# Patient Record
Sex: MALE
[De-identification: names, ages, dates, MRNs, and addresses within clinical notes are randomized per-mention and may not be internally consistent; named-entity substitution may affect disease eponyms.]

---

## 2018-05-22 ENCOUNTER — HOSPITAL (OUTPATIENT)
Dept: OTHER | Age: 53
End: 2018-05-22
Attending: EMERGENCY MEDICINE

## 2018-05-22 LAB
ANALYZER ANC (IANC): ABNORMAL
ANION GAP SERPL CALC-SCNC: 10 MMOL/L (ref 10–20)
BASOPHILS # BLD: 0 THOUSAND/MCL (ref 0–0.3)
BASOPHILS NFR BLD: 1 %
BUN SERPL-MCNC: 16 MG/DL (ref 6–20)
BUN/CREAT SERPL: 15 (ref 7–25)
CALCIUM SERPL-MCNC: 9.1 MG/DL (ref 8.4–10.2)
CHLORIDE: 108 MMOL/L (ref 98–107)
CO2 SERPL-SCNC: 32 MMOL/L (ref 21–32)
CREAT SERPL-MCNC: 1.06 MG/DL (ref 0.67–1.17)
DIFFERENTIAL METHOD BLD: ABNORMAL
EOSINOPHIL # BLD: 0.1 THOUSAND/MCL (ref 0.1–0.5)
EOSINOPHIL NFR BLD: 2 %
ERYTHROCYTE [DISTWIDTH] IN BLOOD: 13 % (ref 11–15)
GLUCOSE SERPL-MCNC: 93 MG/DL (ref 65–99)
HEMATOCRIT: 40.6 % (ref 39–51)
HGB BLD-MCNC: 13.7 GM/DL (ref 13–17)
LYMPHOCYTES # BLD: 1.7 THOUSAND/MCL (ref 1–4)
LYMPHOCYTES NFR BLD: 52 %
MCH RBC QN AUTO: 30.6 PG (ref 26–34)
MCHC RBC AUTO-ENTMCNC: 33.7 GM/DL (ref 32–36.5)
MCV RBC AUTO: 90.8 FL (ref 78–100)
MONOCYTES # BLD: 0.3 THOUSAND/MCL (ref 0.3–0.9)
MONOCYTES NFR BLD: 11 %
NEUTROPHILS # BLD: 1.1 THOUSAND/MCL (ref 1.8–7.7)
NEUTROPHILS NFR BLD: 34 %
NEUTS SEG NFR BLD: ABNORMAL %
NRBC (NRBCRE): ABNORMAL
PLATELET # BLD: 192 THOUSAND/MCL (ref 140–450)
POTASSIUM SERPL-SCNC: 4.2 MMOL/L (ref 3.4–5.1)
RBC # BLD: 4.47 MILLION/MCL (ref 4.5–5.9)
SODIUM SERPL-SCNC: 146 MMOL/L (ref 135–145)
URATE SERPL-MCNC: 4.9 MG/DL (ref 3.5–7.2)
WBC # BLD: 3.2 THOUSAND/MCL (ref 4.2–11)

## 2021-08-27 PROBLEM — Z00.00 ENCOUNTER FOR PREVENTIVE HEALTH EXAMINATION: Status: ACTIVE | Noted: 2021-08-27

## 2021-09-03 DIAGNOSIS — Z01.818 ENCOUNTER FOR OTHER PREPROCEDURAL EXAMINATION: ICD-10-CM

## 2021-09-04 ENCOUNTER — APPOINTMENT (OUTPATIENT)
Dept: DISASTER EMERGENCY | Facility: CLINIC | Age: 56
End: 2021-09-04

## 2021-09-05 LAB — SARS-COV-2 N GENE NPH QL NAA+PROBE: NOT DETECTED

## 2021-09-07 ENCOUNTER — OUTPATIENT (OUTPATIENT)
Dept: OUTPATIENT SERVICES | Facility: HOSPITAL | Age: 56
LOS: 1 days | End: 2021-09-07
Payer: COMMERCIAL

## 2021-09-07 VITALS
RESPIRATION RATE: 16 BRPM | HEART RATE: 68 BPM | DIASTOLIC BLOOD PRESSURE: 90 MMHG | SYSTOLIC BLOOD PRESSURE: 121 MMHG | OXYGEN SATURATION: 96 %

## 2021-09-07 VITALS
TEMPERATURE: 99 F | OXYGEN SATURATION: 100 % | SYSTOLIC BLOOD PRESSURE: 149 MMHG | HEIGHT: 76 IN | WEIGHT: 255.07 LBS | RESPIRATION RATE: 18 BRPM | HEART RATE: 82 BPM | DIASTOLIC BLOOD PRESSURE: 87 MMHG

## 2021-09-07 DIAGNOSIS — R94.39 ABNORMAL RESULT OF OTHER CARDIOVASCULAR FUNCTION STUDY: ICD-10-CM

## 2021-09-07 LAB
ANION GAP SERPL CALC-SCNC: 12 MMOL/L — SIGNIFICANT CHANGE UP (ref 5–17)
BUN SERPL-MCNC: 12 MG/DL — SIGNIFICANT CHANGE UP (ref 7–23)
CALCIUM SERPL-MCNC: 10 MG/DL — SIGNIFICANT CHANGE UP (ref 8.4–10.5)
CHLORIDE SERPL-SCNC: 107 MMOL/L — SIGNIFICANT CHANGE UP (ref 96–108)
CO2 SERPL-SCNC: 22 MMOL/L — SIGNIFICANT CHANGE UP (ref 22–31)
CREAT SERPL-MCNC: 1.21 MG/DL — SIGNIFICANT CHANGE UP (ref 0.5–1.3)
GLUCOSE SERPL-MCNC: 111 MG/DL — HIGH (ref 70–99)
HCT VFR BLD CALC: 40.5 % — SIGNIFICANT CHANGE UP (ref 39–50)
HGB BLD-MCNC: 12.7 G/DL — LOW (ref 13–17)
MAGNESIUM SERPL-MCNC: 2 MG/DL — SIGNIFICANT CHANGE UP (ref 1.6–2.6)
MCHC RBC-ENTMCNC: 28.6 PG — SIGNIFICANT CHANGE UP (ref 27–34)
MCHC RBC-ENTMCNC: 31.4 GM/DL — LOW (ref 32–36)
MCV RBC AUTO: 91.2 FL — SIGNIFICANT CHANGE UP (ref 80–100)
NRBC # BLD: 0 /100 WBCS — SIGNIFICANT CHANGE UP (ref 0–0)
PLATELET # BLD AUTO: 227 K/UL — SIGNIFICANT CHANGE UP (ref 150–400)
POTASSIUM SERPL-MCNC: 4.3 MMOL/L — SIGNIFICANT CHANGE UP (ref 3.5–5.3)
POTASSIUM SERPL-SCNC: 4.3 MMOL/L — SIGNIFICANT CHANGE UP (ref 3.5–5.3)
RBC # BLD: 4.44 M/UL — SIGNIFICANT CHANGE UP (ref 4.2–5.8)
RBC # FLD: 13.2 % — SIGNIFICANT CHANGE UP (ref 10.3–14.5)
SODIUM SERPL-SCNC: 141 MMOL/L — SIGNIFICANT CHANGE UP (ref 135–145)
WBC # BLD: 6.03 K/UL — SIGNIFICANT CHANGE UP (ref 3.8–10.5)
WBC # FLD AUTO: 6.03 K/UL — SIGNIFICANT CHANGE UP (ref 3.8–10.5)

## 2021-09-07 PROCEDURE — 83735 ASSAY OF MAGNESIUM: CPT

## 2021-09-07 PROCEDURE — C1887: CPT

## 2021-09-07 PROCEDURE — 93010 ELECTROCARDIOGRAM REPORT: CPT

## 2021-09-07 PROCEDURE — 93458 L HRT ARTERY/VENTRICLE ANGIO: CPT | Mod: 26

## 2021-09-07 PROCEDURE — 80048 BASIC METABOLIC PNL TOTAL CA: CPT

## 2021-09-07 PROCEDURE — 99152 MOD SED SAME PHYS/QHP 5/>YRS: CPT

## 2021-09-07 PROCEDURE — C1769: CPT

## 2021-09-07 PROCEDURE — 85027 COMPLETE CBC AUTOMATED: CPT

## 2021-09-07 PROCEDURE — 93458 L HRT ARTERY/VENTRICLE ANGIO: CPT

## 2021-09-07 PROCEDURE — C1894: CPT

## 2021-09-07 PROCEDURE — 93005 ELECTROCARDIOGRAM TRACING: CPT

## 2021-09-07 RX ORDER — AMLODIPINE BESYLATE 2.5 MG/1
1 TABLET ORAL
Qty: 0 | Refills: 0 | DISCHARGE

## 2021-09-07 RX ORDER — METOPROLOL TARTRATE 50 MG
1 TABLET ORAL
Qty: 0 | Refills: 0 | DISCHARGE

## 2021-09-07 RX ORDER — TAMSULOSIN HYDROCHLORIDE 0.4 MG/1
1 CAPSULE ORAL
Qty: 0 | Refills: 0 | DISCHARGE

## 2021-09-07 RX ORDER — ATORVASTATIN CALCIUM 80 MG/1
1 TABLET, FILM COATED ORAL
Qty: 0 | Refills: 0 | DISCHARGE

## 2021-09-07 RX ORDER — ASPIRIN/CALCIUM CARB/MAGNESIUM 324 MG
1 TABLET ORAL
Qty: 0 | Refills: 0 | DISCHARGE

## 2021-09-07 RX ORDER — LOSARTAN POTASSIUM 100 MG/1
1 TABLET, FILM COATED ORAL
Qty: 0 | Refills: 0 | DISCHARGE

## 2021-09-07 NOTE — ASU DISCHARGE PLAN (ADULT/PEDIATRIC) - ASU DC SPECIAL INSTRUCTIONSFT

## 2021-09-07 NOTE — H&P CARDIOLOGY - HISTORY OF PRESENT ILLNESS
56 years old male with family Hx of CAD (both parents) PMHx of essential tremor (mild, for about 6 months), HTN who underwent routine stress test that showed ischemia in RCA and was referred for [facility] for further ischemic evaluation.  Denies  CP, dizziness, diaphoresis, palpitations, nausea, vomiting, peripheral edema, recent weight gain, or syncopal episodes.  Denies any implantable cardiac monitoring device implants.         PCP Dr Dilia Flores

## 2021-09-07 NOTE — ASU DISCHARGE PLAN (ADULT/PEDIATRIC) - CARE PROVIDER_API CALL
Nick Blanco  CARDIOLOGY  [address], [place] [address]  Phone: [phone]  Fax: [phone]  Follow Up Time: 2 weeks

## 2024-01-01 ENCOUNTER — EMERGENCY (EMERGENCY)
Facility: HOSPITAL | Age: 59
LOS: 0 days | Discharge: HOME | End: 2024-08-31
Attending: STUDENT IN AN ORGANIZED HEALTH CARE EDUCATION/TRAINING PROGRAM
Payer: MEDICARE

## 2024-01-01 ENCOUNTER — INPATIENT (INPATIENT)
Facility: HOSPITAL | Age: 59
LOS: 1 days | End: 2024-09-02
Attending: INTERNAL MEDICINE | Admitting: INTERNAL MEDICINE
Payer: MEDICAID

## 2024-01-01 VITALS
HEART RATE: 60 BPM | HEIGHT: 76 IN | RESPIRATION RATE: 20 BRPM | DIASTOLIC BLOOD PRESSURE: 93 MMHG | SYSTOLIC BLOOD PRESSURE: 139 MMHG | OXYGEN SATURATION: 96 % | TEMPERATURE: 98 F | WEIGHT: 250 LBS

## 2024-01-01 VITALS
TEMPERATURE: 95 F | DIASTOLIC BLOOD PRESSURE: 93 MMHG | HEART RATE: 60 BPM | RESPIRATION RATE: 16 BRPM | SYSTOLIC BLOOD PRESSURE: 147 MMHG | WEIGHT: 250 LBS | HEIGHT: 76 IN | OXYGEN SATURATION: 99 %

## 2024-01-01 VITALS
HEART RATE: 60 BPM | OXYGEN SATURATION: 98 % | TEMPERATURE: 96 F | SYSTOLIC BLOOD PRESSURE: 147 MMHG | RESPIRATION RATE: 18 BRPM | DIASTOLIC BLOOD PRESSURE: 94 MMHG

## 2024-01-01 VITALS — TEMPERATURE: 97 F

## 2024-01-01 DIAGNOSIS — S00.83XA CONTUSION OF OTHER PART OF HEAD, INITIAL ENCOUNTER: ICD-10-CM

## 2024-01-01 DIAGNOSIS — I10 ESSENTIAL (PRIMARY) HYPERTENSION: ICD-10-CM

## 2024-01-01 DIAGNOSIS — S09.90XA UNSPECIFIED INJURY OF HEAD, INITIAL ENCOUNTER: ICD-10-CM
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
## 2024-01-01 DIAGNOSIS — N40.0 BENIGN PROSTATIC HYPERPLASIA WITHOUT LOWER URINARY TRACT SYMPTOMS: ICD-10-CM

## 2024-01-01 DIAGNOSIS — J98.4 OTHER DISORDERS OF LUNG: ICD-10-CM

## 2024-01-01 DIAGNOSIS — F03.90 UNSPECIFIED DEMENTIA, UNSPECIFIED SEVERITY, WITHOUT BEHAVIORAL DISTURBANCE, PSYCHOTIC DISTURBANCE, MOOD DISTURBANCE, AND ANXIETY: ICD-10-CM

## 2024-01-01 DIAGNOSIS — Z86.69 PERSONAL HISTORY OF OTHER DISEASES OF THE NERVOUS SYSTEM AND SENSE ORGANS: ICD-10-CM

## 2024-01-01 DIAGNOSIS — R53.1 WEAKNESS: ICD-10-CM

## 2024-01-01 DIAGNOSIS — G20.A1 PARKINSON'S DISEASE WITHOUT DYSKINESIA, WITHOUT MENTION OF FLUCTUATIONS: ICD-10-CM

## 2024-01-01 DIAGNOSIS — M25.512 PAIN IN LEFT SHOULDER: ICD-10-CM
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
## 2024-01-01 LAB
ALBUMIN SERPL ELPH-MCNC: 3.2 G/DL — LOW (ref 3.3–5)
ALP SERPL-CCNC: 86 U/L — SIGNIFICANT CHANGE UP (ref 40–120)
ALT FLD-CCNC: 52 U/L — SIGNIFICANT CHANGE UP (ref 12–78)
ANION GAP SERPL CALC-SCNC: 5 MMOL/L — SIGNIFICANT CHANGE UP (ref 5–17)
AST SERPL-CCNC: 85 U/L — HIGH (ref 15–37)
BASOPHILS # BLD AUTO: 0.02 K/UL — SIGNIFICANT CHANGE UP (ref 0–0.2)
BASOPHILS NFR BLD AUTO: 0.4 % — SIGNIFICANT CHANGE UP (ref 0–2)
BILIRUB SERPL-MCNC: 0.5 MG/DL — SIGNIFICANT CHANGE UP (ref 0.2–1.2)
BUN SERPL-MCNC: 18 MG/DL — SIGNIFICANT CHANGE UP (ref 7–23)
CALCIUM SERPL-MCNC: 9.4 MG/DL — SIGNIFICANT CHANGE UP (ref 8.5–10.1)
CHLORIDE SERPL-SCNC: 109 MMOL/L — HIGH (ref 96–108)
CO2 SERPL-SCNC: 25 MMOL/L — SIGNIFICANT CHANGE UP (ref 22–31)
CREAT SERPL-MCNC: 0.94 MG/DL — SIGNIFICANT CHANGE UP (ref 0.5–1.3)
EGFR: 92 ML/MIN/1.73M2 — SIGNIFICANT CHANGE UP
EGFR: 92 ML/MIN/1.73M2 — SIGNIFICANT CHANGE UP
EOSINOPHIL # BLD AUTO: 0.04 K/UL — SIGNIFICANT CHANGE UP (ref 0–0.5)
EOSINOPHIL NFR BLD AUTO: 0.8 % — SIGNIFICANT CHANGE UP (ref 0–6)
GLUCOSE BLDC GLUCOMTR-MCNC: 17 MG/DL — CRITICAL LOW (ref 70–99)
GLUCOSE BLDC GLUCOMTR-MCNC: 24 MG/DL — CRITICAL LOW (ref 70–99)
GLUCOSE BLDC GLUCOMTR-MCNC: >600 MG/DL — CRITICAL HIGH (ref 70–99)
GLUCOSE SERPL-MCNC: 93 MG/DL — SIGNIFICANT CHANGE UP (ref 70–99)
HCT VFR BLD CALC: 35.5 % — LOW (ref 39–50)
HGB BLD-MCNC: 11.5 G/DL — LOW (ref 13–17)
IMM GRANULOCYTES NFR BLD AUTO: 0.2 % — SIGNIFICANT CHANGE UP (ref 0–0.9)
LYMPHOCYTES # BLD AUTO: 1.88 K/UL — SIGNIFICANT CHANGE UP (ref 1–3.3)
LYMPHOCYTES # BLD AUTO: 36.4 % — SIGNIFICANT CHANGE UP (ref 13–44)
MCHC RBC-ENTMCNC: 29.2 PG — SIGNIFICANT CHANGE UP (ref 27–34)
MCHC RBC-ENTMCNC: 32.4 G/DL — SIGNIFICANT CHANGE UP (ref 32–36)
MCV RBC AUTO: 90.1 FL — SIGNIFICANT CHANGE UP (ref 80–100)
MONOCYTES # BLD AUTO: 0.89 K/UL — SIGNIFICANT CHANGE UP (ref 0–0.9)
MONOCYTES NFR BLD AUTO: 17.2 % — HIGH (ref 2–14)
NEUTROPHILS # BLD AUTO: 2.33 K/UL — SIGNIFICANT CHANGE UP (ref 1.8–7.4)
NEUTROPHILS NFR BLD AUTO: 45 % — SIGNIFICANT CHANGE UP (ref 43–77)
NRBC # BLD: 0 /100 WBCS — SIGNIFICANT CHANGE UP (ref 0–0)
NRBC BLD-RTO: 0 /100 WBCS — SIGNIFICANT CHANGE UP (ref 0–0)
PLATELET # BLD AUTO: 171 K/UL — SIGNIFICANT CHANGE UP (ref 150–400)
POTASSIUM SERPL-MCNC: 4.9 MMOL/L — SIGNIFICANT CHANGE UP (ref 3.5–5.3)
POTASSIUM SERPL-SCNC: 4.9 MMOL/L — SIGNIFICANT CHANGE UP (ref 3.5–5.3)
PROT SERPL-MCNC: 7 GM/DL — SIGNIFICANT CHANGE UP (ref 6–8.3)
RBC # BLD: 3.94 M/UL — LOW (ref 4.2–5.8)
RBC # FLD: 14.8 % — HIGH (ref 10.3–14.5)
SODIUM SERPL-SCNC: 139 MMOL/L — SIGNIFICANT CHANGE UP (ref 135–145)
WBC # BLD: 5.17 K/UL — SIGNIFICANT CHANGE UP (ref 3.8–10.5)
WBC # FLD AUTO: 5.17 K/UL — SIGNIFICANT CHANGE UP (ref 3.8–10.5)

## 2024-01-01 PROCEDURE — 73030 X-RAY EXAM OF SHOULDER: CPT | Mod: 26,RT

## 2024-01-01 PROCEDURE — 72125 CT NECK SPINE W/O DYE: CPT | Mod: 26,MC

## 2024-01-01 PROCEDURE — 99285 EMERGENCY DEPT VISIT HI MDM: CPT

## 2024-01-01 PROCEDURE — 99232 SBSQ HOSP IP/OBS MODERATE 35: CPT

## 2024-01-01 PROCEDURE — 70450 CT HEAD/BRAIN W/O DYE: CPT | Mod: 26,MC

## 2024-01-01 PROCEDURE — 99223 1ST HOSP IP/OBS HIGH 75: CPT

## 2024-01-01 PROCEDURE — 72192 CT PELVIS W/O DYE: CPT | Mod: 26,MC

## 2024-01-01 PROCEDURE — 71250 CT THORAX DX C-: CPT | Mod: 26,MC

## 2024-01-01 PROCEDURE — 99239 HOSP IP/OBS DSCHRG MGMT >30: CPT

## 2024-01-01 RX ORDER — TAMSULOSIN HYDROCHLORIDE 0.4 MG/1
0.4 CAPSULE ORAL AT BEDTIME
Refills: 0 | Status: DISCONTINUED | OUTPATIENT
Start: 2024-01-01 | End: 2024-01-01

## 2024-01-01 RX ORDER — METOPROLOL TARTRATE 100 MG/1
25 TABLET ORAL DAILY
Refills: 0 | Status: DISCONTINUED | OUTPATIENT
Start: 2024-01-01 | End: 2024-01-01

## 2024-01-01 RX ORDER — ERGOCALCIFEROL (VITAMIN D2) 200 MCG/ML
50000 DROPS ORAL
Refills: 0 | Status: DISCONTINUED | OUTPATIENT
Start: 2024-01-01 | End: 2024-01-01

## 2024-01-01 RX ORDER — LOSARTAN POTASSIUM 50 MG/1
100 TABLET ORAL DAILY
Refills: 0 | Status: DISCONTINUED | OUTPATIENT
Start: 2024-01-01 | End: 2024-01-01

## 2024-01-01 RX ORDER — AMLODIPINE BESYLATE 10 MG/1
5 TABLET ORAL DAILY
Refills: 0 | Status: DISCONTINUED | OUTPATIENT
Start: 2024-01-01 | End: 2024-01-01

## 2024-01-01 RX ORDER — PANTOPRAZOLE SODIUM 40 MG
40 TABLET, DELAYED RELEASE (ENTERIC COATED) ORAL
Refills: 0 | Status: DISCONTINUED | OUTPATIENT
Start: 2024-01-01 | End: 2024-01-01

## 2024-01-01 RX ORDER — ENOXAPARIN SODIUM 100 MG/ML
40 INJECTION SUBCUTANEOUS EVERY 24 HOURS
Refills: 0 | Status: DISCONTINUED | OUTPATIENT
Start: 2024-01-01 | End: 2024-01-01

## 2024-01-01 RX ORDER — CYANOCOBALAMIN (VITAMIN B-12) 500MCG/0.1
1000 GEL (ML) NASAL DAILY
Refills: 0 | Status: DISCONTINUED | OUTPATIENT
Start: 2024-01-01 | End: 2024-01-01

## 2024-01-01 RX ORDER — LACTULOSE 10 G
20 PACKET (EA) ORAL
Refills: 0 | Status: DISCONTINUED | OUTPATIENT
Start: 2024-01-01 | End: 2024-01-01

## 2024-01-01 RX ORDER — SENNA 187 MG
2 TABLET ORAL AT BEDTIME
Refills: 0 | Status: DISCONTINUED | OUTPATIENT
Start: 2024-01-01 | End: 2024-01-01

## 2024-01-01 RX ORDER — AMANTADINE HCL 100 MG
100 CAPSULE ORAL DAILY
Refills: 0 | Status: DISCONTINUED | OUTPATIENT
Start: 2024-01-01 | End: 2024-01-01

## 2024-01-01 RX ORDER — FOLIC ACID/MULTIVIT,IRON,MINER 0.4MG-18MG
1000 TABLET,CHEWABLE ORAL
Refills: 0 | Status: DISCONTINUED | OUTPATIENT
Start: 2024-01-01 | End: 2024-01-01

## 2024-01-01 RX ORDER — ASPIRIN 81 MG
81 TABLET, DELAYED RELEASE (ENTERIC COATED) ORAL DAILY
Refills: 0 | Status: DISCONTINUED | OUTPATIENT
Start: 2024-01-01 | End: 2024-01-01

## 2024-01-01 RX ADMIN — Medication 100 MILLIGRAM(S): at 12:00

## 2024-01-01 RX ADMIN — Medication 1000 MICROGRAM(S): at 12:00

## 2024-01-01 RX ADMIN — Medication 81 MILLIGRAM(S): at 11:59

## 2024-01-01 RX ADMIN — LOSARTAN POTASSIUM 100 MILLIGRAM(S): 50 TABLET ORAL at 05:37

## 2024-01-01 RX ADMIN — Medication 2 TABLET(S): at 21:44

## 2024-01-01 RX ADMIN — AMLODIPINE BESYLATE 5 MILLIGRAM(S): 10 TABLET ORAL at 05:37

## 2024-01-01 RX ADMIN — METOPROLOL TARTRATE 25 MILLIGRAM(S): 100 TABLET ORAL at 05:37

## 2024-01-01 RX ADMIN — Medication 20 GRAM(S): at 14:37

## 2024-01-01 RX ADMIN — ENOXAPARIN SODIUM 40 MILLIGRAM(S): 100 INJECTION SUBCUTANEOUS at 05:37

## 2024-01-01 RX ADMIN — Medication 20 MILLIGRAM(S): at 21:45

## 2024-01-01 RX ADMIN — TAMSULOSIN HYDROCHLORIDE 0.4 MILLIGRAM(S): 0.4 CAPSULE ORAL at 21:45

## 2024-03-29 ENCOUNTER — INPATIENT (INPATIENT)
Facility: HOSPITAL | Age: 59
LOS: 3 days | Discharge: NURSING, REHAB OR LONG-TERM CARE | End: 2024-04-02
Attending: INTERNAL MEDICINE | Admitting: INTERNAL MEDICINE
Payer: MEDICAID

## 2024-03-29 VITALS
HEIGHT: 75.5 IN | RESPIRATION RATE: 16 BRPM | OXYGEN SATURATION: 99 % | TEMPERATURE: 98 F | HEART RATE: 72 BPM | WEIGHT: 255.07 LBS | SYSTOLIC BLOOD PRESSURE: 123 MMHG | DIASTOLIC BLOOD PRESSURE: 69 MMHG

## 2024-03-29 PROCEDURE — 99285 EMERGENCY DEPT VISIT HI MDM: CPT

## 2024-03-29 NOTE — ED ADULT TRIAGE NOTE - CHIEF COMPLAINT QUOTE
BIBA patient fell twice today, + head strike yesterday, - loc. c/o dizziness. AAOX3 in triage. Hx parkinson. Takes aspirin daily. F/S 94

## 2024-03-30 ENCOUNTER — RESULT REVIEW (OUTPATIENT)
Age: 59
End: 2024-03-30

## 2024-03-30 DIAGNOSIS — G20.A1 PARKINSON'S DISEASE WITHOUT DYSKINESIA, WITHOUT MENTION OF FLUCTUATIONS: ICD-10-CM

## 2024-03-30 DIAGNOSIS — E78.5 HYPERLIPIDEMIA, UNSPECIFIED: ICD-10-CM

## 2024-03-30 DIAGNOSIS — I10 ESSENTIAL (PRIMARY) HYPERTENSION: ICD-10-CM

## 2024-03-30 DIAGNOSIS — R55 SYNCOPE AND COLLAPSE: ICD-10-CM

## 2024-03-30 DIAGNOSIS — N40.0 BENIGN PROSTATIC HYPERPLASIA WITHOUT LOWER URINARY TRACT SYMPTOMS: ICD-10-CM

## 2024-03-30 DIAGNOSIS — I25.10 ATHEROSCLEROTIC HEART DISEASE OF NATIVE CORONARY ARTERY WITHOUT ANGINA PECTORIS: ICD-10-CM

## 2024-03-30 DIAGNOSIS — R29.6 REPEATED FALLS: ICD-10-CM

## 2024-03-30 LAB
ALBUMIN SERPL ELPH-MCNC: 3.2 G/DL — LOW (ref 3.3–5)
ALP SERPL-CCNC: 70 U/L — SIGNIFICANT CHANGE UP (ref 40–120)
ALT FLD-CCNC: 14 U/L — SIGNIFICANT CHANGE UP (ref 12–78)
ANION GAP SERPL CALC-SCNC: 5 MMOL/L — SIGNIFICANT CHANGE UP (ref 5–17)
APTT BLD: 32.1 SEC — SIGNIFICANT CHANGE UP (ref 24.5–35.6)
AST SERPL-CCNC: 30 U/L — SIGNIFICANT CHANGE UP (ref 15–37)
BASOPHILS # BLD AUTO: 0.02 K/UL — SIGNIFICANT CHANGE UP (ref 0–0.2)
BASOPHILS NFR BLD AUTO: 0.3 % — SIGNIFICANT CHANGE UP (ref 0–2)
BILIRUB SERPL-MCNC: 0.6 MG/DL — SIGNIFICANT CHANGE UP (ref 0.2–1.2)
BUN SERPL-MCNC: 19 MG/DL — SIGNIFICANT CHANGE UP (ref 7–23)
CALCIUM SERPL-MCNC: 9.4 MG/DL — SIGNIFICANT CHANGE UP (ref 8.5–10.1)
CHLORIDE SERPL-SCNC: 108 MMOL/L — SIGNIFICANT CHANGE UP (ref 96–108)
CO2 SERPL-SCNC: 26 MMOL/L — SIGNIFICANT CHANGE UP (ref 22–31)
CREAT SERPL-MCNC: 1.03 MG/DL — SIGNIFICANT CHANGE UP (ref 0.5–1.3)
EGFR: 83 ML/MIN/1.73M2 — SIGNIFICANT CHANGE UP
EOSINOPHIL # BLD AUTO: 0.07 K/UL — SIGNIFICANT CHANGE UP (ref 0–0.5)
EOSINOPHIL NFR BLD AUTO: 1.1 % — SIGNIFICANT CHANGE UP (ref 0–6)
GLUCOSE SERPL-MCNC: 93 MG/DL — SIGNIFICANT CHANGE UP (ref 70–99)
HCT VFR BLD CALC: 37 % — LOW (ref 39–50)
HGB BLD-MCNC: 12 G/DL — LOW (ref 13–17)
IMM GRANULOCYTES NFR BLD AUTO: 0.2 % — SIGNIFICANT CHANGE UP (ref 0–0.9)
INR BLD: 1.14 RATIO — SIGNIFICANT CHANGE UP (ref 0.85–1.18)
LYMPHOCYTES # BLD AUTO: 2.08 K/UL — SIGNIFICANT CHANGE UP (ref 1–3.3)
LYMPHOCYTES # BLD AUTO: 32.2 % — SIGNIFICANT CHANGE UP (ref 13–44)
MCHC RBC-ENTMCNC: 29.5 PG — SIGNIFICANT CHANGE UP (ref 27–34)
MCHC RBC-ENTMCNC: 32.4 G/DL — SIGNIFICANT CHANGE UP (ref 32–36)
MCV RBC AUTO: 90.9 FL — SIGNIFICANT CHANGE UP (ref 80–100)
MONOCYTES # BLD AUTO: 0.86 K/UL — SIGNIFICANT CHANGE UP (ref 0–0.9)
MONOCYTES NFR BLD AUTO: 13.3 % — SIGNIFICANT CHANGE UP (ref 2–14)
NEUTROPHILS # BLD AUTO: 3.42 K/UL — SIGNIFICANT CHANGE UP (ref 1.8–7.4)
NEUTROPHILS NFR BLD AUTO: 52.9 % — SIGNIFICANT CHANGE UP (ref 43–77)
NRBC # BLD: 0 /100 WBCS — SIGNIFICANT CHANGE UP (ref 0–0)
PLATELET # BLD AUTO: 189 K/UL — SIGNIFICANT CHANGE UP (ref 150–400)
POTASSIUM SERPL-MCNC: 4.6 MMOL/L — SIGNIFICANT CHANGE UP (ref 3.5–5.3)
POTASSIUM SERPL-SCNC: 4.6 MMOL/L — SIGNIFICANT CHANGE UP (ref 3.5–5.3)
PROT SERPL-MCNC: 7.2 GM/DL — SIGNIFICANT CHANGE UP (ref 6–8.3)
PROTHROM AB SERPL-ACNC: 13.6 SEC — HIGH (ref 9.5–13)
RBC # BLD: 4.07 M/UL — LOW (ref 4.2–5.8)
RBC # FLD: 13.5 % — SIGNIFICANT CHANGE UP (ref 10.3–14.5)
SODIUM SERPL-SCNC: 139 MMOL/L — SIGNIFICANT CHANGE UP (ref 135–145)
TROPONIN I, HIGH SENSITIVITY RESULT: 15.3 NG/L — SIGNIFICANT CHANGE UP
WBC # BLD: 6.46 K/UL — SIGNIFICANT CHANGE UP (ref 3.8–10.5)
WBC # FLD AUTO: 6.46 K/UL — SIGNIFICANT CHANGE UP (ref 3.8–10.5)

## 2024-03-30 PROCEDURE — 70450 CT HEAD/BRAIN W/O DYE: CPT | Mod: 26,MC

## 2024-03-30 PROCEDURE — 71045 X-RAY EXAM CHEST 1 VIEW: CPT | Mod: 26

## 2024-03-30 PROCEDURE — 72170 X-RAY EXAM OF PELVIS: CPT | Mod: 26

## 2024-03-30 PROCEDURE — 93010 ELECTROCARDIOGRAM REPORT: CPT

## 2024-03-30 PROCEDURE — 93306 TTE W/DOPPLER COMPLETE: CPT | Mod: 26

## 2024-03-30 PROCEDURE — 99221 1ST HOSP IP/OBS SF/LOW 40: CPT

## 2024-03-30 RX ORDER — TAMSULOSIN HYDROCHLORIDE 0.4 MG/1
0.4 CAPSULE ORAL AT BEDTIME
Refills: 0 | Status: DISCONTINUED | OUTPATIENT
Start: 2024-03-30 | End: 2024-04-02

## 2024-03-30 RX ORDER — LANOLIN ALCOHOL/MO/W.PET/CERES
3 CREAM (GRAM) TOPICAL AT BEDTIME
Refills: 0 | Status: DISCONTINUED | OUTPATIENT
Start: 2024-03-30 | End: 2024-04-02

## 2024-03-30 RX ORDER — ASPIRIN/CALCIUM CARB/MAGNESIUM 324 MG
81 TABLET ORAL DAILY
Refills: 0 | Status: DISCONTINUED | OUTPATIENT
Start: 2024-03-30 | End: 2024-04-02

## 2024-03-30 RX ORDER — ACETAMINOPHEN 500 MG
650 TABLET ORAL EVERY 6 HOURS
Refills: 0 | Status: DISCONTINUED | OUTPATIENT
Start: 2024-03-30 | End: 2024-04-02

## 2024-03-30 RX ORDER — PREGABALIN 225 MG/1
2000 CAPSULE ORAL DAILY
Refills: 0 | Status: DISCONTINUED | OUTPATIENT
Start: 2024-03-30 | End: 2024-04-02

## 2024-03-30 RX ORDER — LOSARTAN POTASSIUM 100 MG/1
100 TABLET, FILM COATED ORAL DAILY
Refills: 0 | Status: DISCONTINUED | OUTPATIENT
Start: 2024-03-30 | End: 2024-04-02

## 2024-03-30 RX ORDER — AMLODIPINE BESYLATE 2.5 MG/1
5 TABLET ORAL DAILY
Refills: 0 | Status: DISCONTINUED | OUTPATIENT
Start: 2024-03-30 | End: 2024-04-02

## 2024-03-30 RX ORDER — METOPROLOL TARTRATE 50 MG
25 TABLET ORAL DAILY
Refills: 0 | Status: DISCONTINUED | OUTPATIENT
Start: 2024-03-30 | End: 2024-04-02

## 2024-03-30 RX ORDER — ATORVASTATIN CALCIUM 80 MG/1
20 TABLET, FILM COATED ORAL AT BEDTIME
Refills: 0 | Status: DISCONTINUED | OUTPATIENT
Start: 2024-03-30 | End: 2024-04-02

## 2024-03-30 RX ORDER — ONDANSETRON 8 MG/1
4 TABLET, FILM COATED ORAL EVERY 8 HOURS
Refills: 0 | Status: DISCONTINUED | OUTPATIENT
Start: 2024-03-30 | End: 2024-04-02

## 2024-03-30 RX ADMIN — AMLODIPINE BESYLATE 5 MILLIGRAM(S): 2.5 TABLET ORAL at 07:47

## 2024-03-30 RX ADMIN — PREGABALIN 2000 MICROGRAM(S): 225 CAPSULE ORAL at 11:35

## 2024-03-30 RX ADMIN — TAMSULOSIN HYDROCHLORIDE 0.4 MILLIGRAM(S): 0.4 CAPSULE ORAL at 21:33

## 2024-03-30 RX ADMIN — Medication 25 MILLIGRAM(S): at 07:12

## 2024-03-30 RX ADMIN — LOSARTAN POTASSIUM 100 MILLIGRAM(S): 100 TABLET, FILM COATED ORAL at 07:47

## 2024-03-30 RX ADMIN — ATORVASTATIN CALCIUM 20 MILLIGRAM(S): 80 TABLET, FILM COATED ORAL at 21:34

## 2024-03-30 RX ADMIN — Medication 81 MILLIGRAM(S): at 11:35

## 2024-03-30 NOTE — ED ADULT NURSE NOTE - ED STAT RN HANDOFF DETAILS
Handoff report received from CUAUHTEMOC GALAN Pt is stable at this time. Denies any pain or complaints.

## 2024-03-30 NOTE — PHYSICAL THERAPY INITIAL EVALUATION ADULT - GENERAL OBSERVATIONS, REHAB EVAL
Pt was encountered supine in bed in ERHO; NAD, cardiac/O2 monitors in place, AXOX3; pt had no complaints of pain. PT Nghia & RN Brandie present at bedside. Pt noted with intermittent resting tremors, impaired dynamic standing balance, decreased coordination & generalized weakness, Skyler Mason at bedside.

## 2024-03-30 NOTE — OCCUPATIONAL THERAPY INITIAL EVALUATION ADULT - PERTINENT HX OF CURRENT PROBLEM, REHAB EVAL
Pt brought to ED secondary to fall. X-ray pelvis negative for acute fracture. Pt has PMH of Parkinsons Disease & frequent falls.

## 2024-03-30 NOTE — PHYSICAL THERAPY INITIAL EVALUATION ADULT - DID THE PATIENT HAVE SURGERY?
This is the hx of NIHARIKA a 60 y/o male patient who was admitted to [facility] due to complications of Near Syncope affecting medical condition and with subsequent affection on functional mobility./n/a

## 2024-03-30 NOTE — ED PROVIDER NOTE - CLINICAL SUMMARY MEDICAL DECISION MAKING FREE TEXT BOX
59-year-old male hypertension, Angioplasty in 2021, Parkinson's presents today for fall.  Patient endorsing feeling lightheaded.  Has had multiple falls in the past couple days.  Patient bedside to provide collateral and states that patient has been declining over the past couple months and will intermittently fall due to his Parkinson's.  Does not use anything to ambulate with.  States that his sister and herself takes care of the patient.  States that he had head today.  Denies LOC.  Does take aspirin daily.  Denies any chest pain or shortness of breath.  Denies dizziness or room spinning sensation.  Denies nausea or vomiting.  Denies dysuria.  Patient has no focal deficits on exam.  Patient with a resting tremor.  No signs of external trauma.  Consider near syncope.  Consider falls from worsening Parkinson's.  Will get CT head.  Check labs.  Can benefit from mission for near syncope/cardiology workup, physical therapy evaluation as patient is not using anything to ambulate and has had multiple falls over the past couple months.

## 2024-03-30 NOTE — H&P ADULT - NSHPLABSRESULTS_GEN_ALL_CORE
12.0   6.46  )-----------( 189      ( 30 Mar 2024 02:30 )             37.0    03-30    139  |  108  |  19  ----------------------------<  93  4.6   |  26  |  1.03    Ca    9.4      30 Mar 2024 02:30    TPro  7.2  /  Alb  3.2<L>  /  TBili  0.6  /  DBili  x   /  AST  30  /  ALT  14  /  AlkPhos  70  03-30    CT head without contrast 3/30/24  IMPRESSION:  No evidence of acute intracranial hemorrhage, midline shift or CT evidence of acute territorial infarct.    If the patient's symptoms persist, consider short interval follow-up head CT or brain MRI if there are no MRI contraindications

## 2024-03-30 NOTE — ED PROVIDER NOTE - NS ED ROS FT
"My left ankle has pain and swelling and I've been treated for gout for the past month but now my doctor thinks it may be cellulitis."
General: Denies fever, chills  HEENT: Denies sensory changes, sore throat  Neck: Denies neck pain, neck stiffness  Resp: Denies coughing, SOB  Cardiovascular: Denies CP, palpitations, LE edema  GI: Denies nausea, vomiting, abdominal pain, diarrhea, constipation, blood in stool  : Denies dysuria, hematuria, frequency, incontinence  MSK: Denies back pain  Neuro: Denies HA, dizziness, numbness, weakness  Skin: Denies rashes.

## 2024-03-30 NOTE — H&P ADULT - ASSESSMENT
Trung Ferguson is a 59 year old male with PMHx of HTN, HLD, BPH, CAD (s/p PCI in 2021), and Parkinson's disease who presented to the ED on 3/29/24 for complaints of recurrent falls and admitted for recurrent falls.    Recurrent falls, possibly secondary to dysautonomia due to progressive Parkinson's? vs. syncope  Complaints of fall x 3 at home within the past week, one fall with associated head hit, all episodes unwitnessed  Baseline ambulates unassisted and holds onto stair rails to ambulate up and down the stairs as per fam  EKG NSR, NCHCT without acute intracranial findings  Unable to obtain orthostatics standing given fall risk  Fall precautions  F/u echocardiogram to r/o structural or valvular heart disease as cause of ?syncope  PT/OT consulted  Consult neurology for consideration of initiation of Parkinson's medications - please call in AM      Chronic medical conditions:   HTN: PTA losartan 100 mg, amlodipine 5 mg, metoprolol succinate 25 mg, hold parameters added  HLD: PTA atorvastatin 20 mg  BPH: PTA tamsulosin 0.4 mg  CAD (s/p PCI in 2021): PTA ASA 81 mg  Normocytic anemia, chronic: hgb 12.0 on admission, baseline ~12.5?, no signs of bleeding    Medication reconciliation completed using med list provided by fam at bedside.    Plan of care discussed with fam at bedside. Trung Ferguson is a 59 year old male with PMHx of HTN, HLD, BPH, CAD (s/p PCI in 2021), and Parkinson's disease who presented to the ED on 3/29/24 for complaints of recurrent falls and admitted for recurrent falls.    Recurrent falls, possibly secondary to dysautonomia due to progressive Parkinson's? vs. ?syncope  Complaints of fall x 3 at home within the past week, one fall was with associated head hit, all episodes unwitnessed  Baseline ambulates unassisted and holds onto stair rails to ambulate up and down the stairs as per fam  EKG NSR, NCHCT without acute intracranial findings  Unable to obtain orthostatics standing given fall risk  Fall precautions  F/u echocardiogram to r/o structural or valvular heart disease as cause of ?syncope  PT/OT consulted  Consult neurology for consideration of initiation of Parkinson's medications - please call in AM      Chronic medical conditions:   HTN: PTA losartan 100 mg, amlodipine 5 mg, metoprolol succinate 25 mg, hold parameters added  HLD: PTA atorvastatin 20 mg  BPH: PTA tamsulosin 0.4 mg  CAD (s/p PCI in 2021): PTA ASA 81 mg  Normocytic anemia, chronic: hgb 12.0 on admission, baseline ~12.5?, no signs of bleeding    Medication reconciliation completed using med list provided by fam at bedside.    Plan of care discussed with fam at bedside.

## 2024-03-30 NOTE — ED ADULT NURSE NOTE - NSFALLRISKINTERV_ED_ALL_ED

## 2024-03-30 NOTE — OCCUPATIONAL THERAPY INITIAL EVALUATION ADULT - ADDITIONAL COMMENTS
Pt reports he lives with sister in a private house with 4-5 steps to enter with left rail. Pt was independent with basic ADLs and mobility without assistive devices prior to admission, however, pt has declined over last several months & has had multiple falls.

## 2024-03-30 NOTE — H&P ADULT - HISTORY OF PRESENT ILLNESS
Trung Ferguson is a 59 year old male with PMHx of HTN, HLD, BPH, CAD (s/p PCI in 2021), and Parkinson's disease who presented to the ED on 3/29/24 for complaints of recurrent falls.    History primarily obtained from niece at bedside. Patient fell twice at home yesterday. Both occurrences were in the bathroom and patient was found on the ground. Patient sustained head hit during episode that occurred in the morning. Also fell once last week. All episodes were unwitnessed. Niece reports entire house is carpeted and patient wears slippers inside the house. Baseline ambulates unassisted and holds handrail to ambulate up and down the stairs. Can feed self and shower. Needs assistance getting dressed. Lives at home with sister, niece, and nephew who all assist him.    In the ED, VSS. Labs grossly unremarkable except hgb 12.0. Troponin WNL. NCHCT without acute intracranial findings.

## 2024-03-30 NOTE — PATIENT PROFILE ADULT - FUNCTIONAL ASSESSMENT - DAILY ACTIVITY 4.
Hyponatremia Hyponatremia Hyponatremia Hyponatremia Hyponatremia Hyponatremia Hyponatremia 1 = Total assistance

## 2024-03-30 NOTE — ED ADULT NURSE NOTE - OBJECTIVE STATEMENT
AAOx3 pt presents to ED reports having two falls today. Pt endorses head strike. Denies LOC, dizziness and blurry vision.

## 2024-03-30 NOTE — ED ADULT NURSE NOTE - NSICDXPASTMEDICALHX_GEN_ALL_CORE_FT
PAST MEDICAL HISTORY:  BPH (benign prostatic hyperplasia)     CAD S/P percutaneous coronary angioplasty     HLD (hyperlipidemia)     HTN (hypertension)     Parkinson's disease

## 2024-03-30 NOTE — OCCUPATIONAL THERAPY INITIAL EVALUATION ADULT - GENERAL OBSERVATIONS, REHAB EVAL
Pt was encountered supine in bed in ERHO; NAD, cardiac/O2 monitors in place, AXOX3; pt had no complaints of pain. PT Nghia & RN Brandie present at bedside. Pt noted with intermittent resting tremors, impaired dynamic standing balance, decreased coordination & generalized weakness. Pt was encountered supine in bed in ERHO; NAD, cardiac/O2 monitors in place, AXOX3; pt had no complaints of pain. PT Nghia & RN Brandie present at bedside. Pt nephew present. Pt noted with intermittent resting tremors, impaired dynamic standing balance, decreased coordination & generalized weakness.

## 2024-03-30 NOTE — PHYSICAL THERAPY INITIAL EVALUATION ADULT - PERTINENT HX OF CURRENT PROBLEM, REHAB EVAL
This is the hx of MARK. a 60 y/o male patient who was admitted to [facility] due to complications of Near Syncope affecting medical condition and with subsequent affection on functional mobility. X-ray pelvis 3/2/24: (-) Fx.

## 2024-03-30 NOTE — H&P ADULT - NSHPPHYSICALEXAM_GEN_ALL_CORE
T(C): 36.5 (03-30-24 @ 03:11), Max: 36.7 (03-29-24 @ 23:12)  HR: 64 (03-30-24 @ 03:11) (64 - 72)  BP: 108/71 (03-30-24 @ 03:11) (108/71 - 123/69)  RR: 19 (03-30-24 @ 03:11) (16 - 19)  SpO2: 99% (03-30-24 @ 03:11) (99% - 99%)    CONSTITUTIONAL: no apparent distress  EYES: PERRLA and symmetric, EOMI  ENMT: poor dentition  RESP: No respiratory distress, no use of accessory muscles; CTA b/l  CV: RRR  GI: Soft, NT, ND  NEURO: resting pill-rolling tremor of b/l hands present

## 2024-03-30 NOTE — PHYSICAL THERAPY INITIAL EVALUATION ADULT - GAIT TRAINING, PT EVAL
To be able to perform ambulation independently using rolling walker for 200 feet using proper technique using AD, with proper posture and functional distance at home in 4 weeks.

## 2024-03-30 NOTE — H&P ADULT - TIME BILLING
coordination of care with ER physician, obtaining history from patient and niece, performing a physical examination, reviewing and interpreting labs and imaging, ordering further studies and tests, explaining the diagnosis to patient and niece, completing medication reconciliation, and documentation as above.

## 2024-03-30 NOTE — PATIENT PROFILE ADULT - FALL HARM RISK - HARM RISK INTERVENTIONS
Pulmonary Rehab Treatment Plan   Name: Guerline Witt Assessment Date: 2023   : 1964 Primary Diagnosis: COPD   Age: 62 y.o. Referring Physician:  Caroline Odin   MRN: [id number] Primary Care Physician: Kendell Nogueira MD       Treatment Diagnosis  Treatment Diagnosis 1: COPD  Referral Date: 10/31/23  Significant Cardiovascular History: Previous PCI  Co-morbidities: Previous MI    Oxygen Saturation / Titration   Stages of Change : Preparation    Oxygen Intervention  Oxygen Use: Yes  Oxygen Type : Nasal canula  Patients Liter Flow : 3  O2 Sat Greater Than 90%: Yes  Nurse/Patient Discussion : yes  Oxygen Education : Oxygen Safety / Terryann Hartselle; Traveling with Oxygen; Proper care of Oxygen Equipment  Oxygen Target Goals : Discontinue oxygen useage; Decrease liters required; Keep SA02 greater than 90%  Patient Stated Goals : pt states \"my goal is to get off of this oxygen\"    Individual Treatment Plan  ITP Visit Type: Re-assessment  1st Date of Exercise: 23  ITP Next Review Date: 12/15/23  Visit #/Total Visits:   EF%: 57.5 % (55-60%)  FEV1 (%PRED): 82  FEV1/FVC: 80  DLCO (mL/mmHg sec): 12.28 ml/mmHg sec (41% of Predicted)  Risk Stratification: Moderate  Pulmonary ITP: Exercise; Psychosocial; Nutrition; Education    COPD Assessment Test (CAT)  Cough : 3  Phlegm (mucus): 5  Chest Tightness: 5  Breathless When Walking Up a Hill or Flight of Steps: 5  Activities at Home: 5  Confident Leaving Home Due to Lung Condition: 0  Sleep Soundly: 5  Energy Level: 5  CAT Total Score : 33     Dyspnea (Shortness of Breath) Evaluation  Resting, Lying Down: 1  Walking on a Level at Your Own Pace: 4  Walking on a Level with Others Your Age: 5  Walking Up a Hill: 5  Walking Up Stairs: 5  While Eatin  Standing Up From a Chair: 2  Brushing Teeth: 2  Shaving and/or Brushing Hair: 2  Showering/Bathin  Dressin  Picking Up and Straightenin  Doing Dishes: 5  Sweeping/Vacuumin  Making Bed: Assistance with ambulation/Assistance OOB with selected safe patient handling equipment/Communicate Risk of Fall with Harm to all staff/Discuss with provider need for PT consult/Monitor gait and stability/Reinforce activity limits and safety measures with patient and family/Tailored Fall Risk Interventions/Use of alarms - bed, chair and/or voice tab/Visual Cue: Yellow wristband and red socks/Bed in lowest position, wheels locked, appropriate side rails in place/Call bell, personal items and telephone in reach/Instruct patient to call for assistance before getting out of bed or chair/Non-slip footwear when patient is out of bed/[place] to call system/Physically safe environment - no spills, clutter or unnecessary equipment/Purposeful Proactive Rounding/Room/bathroom lighting operational, light cord in reach

## 2024-03-30 NOTE — ED PROVIDER NOTE - OBJECTIVE STATEMENT
59-year-old male hypertension, Angioplasty in 2021, Parkinson's presents today for fall.  Patient endorsing feeling lightheaded.  Has had multiple falls in the past couple days.  Patient bedside to provide collateral and states that patient has been declining over the past couple months and will intermittently fall due to his Parkinson's.  Does not use anything to ambulate with.  States that his sister and herself takes care of the patient.  States that he had head today.  Denies LOC.  Does take aspirin daily.  Denies any chest pain or shortness of breath.  Denies dizziness or room spinning sensation.  Denies nausea or vomiting.  Denies dysuria.

## 2024-03-30 NOTE — ED PROVIDER NOTE - PHYSICAL EXAMINATION
General: Well appearing male in no acute distress  HEENT: Normocephalic, atraumatic. Moist mucous membranes. Oropharynx clear. No lymphadenopathy.  Eyes: No scleral icterus. EOMI. RYLEE.  Neck:. Soft and supple. Full ROM without pain. No midline tenderness  Cardiac: Regular rate and regular rhythm. No murmurs, rubs, gallops. Peripheral pulses 2+ and symmetric. No LE edema.  Resp: Lungs CTAB. Speaking in full sentences. No wheezes, rales or rhonchi.  Abd: Soft, non-tender, non-distended. No guarding or rebound. No scars, masses, or lesions.  Back: Spine midline and non-tender. No CVA tenderness.    Skin: No rashes, abrasions, or lacerations.  Neuro: AO x 3. Moves all extremities symmetrically. Motor strength and sensation grossly intact. resting tremor on exam.

## 2024-03-30 NOTE — PHYSICAL THERAPY INITIAL EVALUATION ADULT - PATIENT/FAMILY AGREES WITH PLAN
Patient was seen at [facility] on 6/21/19.     Patient to follow up with Jo Ann MATTSON in 6-8 weeks.    Left message on answering machine to call back.   Subacute rehab/yes

## 2024-03-31 LAB
HCV AB S/CO SERPL IA: 0.14 S/CO — SIGNIFICANT CHANGE UP (ref 0–0.99)
HCV AB SERPL-IMP: SIGNIFICANT CHANGE UP

## 2024-03-31 PROCEDURE — 99232 SBSQ HOSP IP/OBS MODERATE 35: CPT

## 2024-03-31 RX ADMIN — ATORVASTATIN CALCIUM 20 MILLIGRAM(S): 80 TABLET, FILM COATED ORAL at 22:20

## 2024-03-31 RX ADMIN — PREGABALIN 2000 MICROGRAM(S): 225 CAPSULE ORAL at 11:21

## 2024-03-31 RX ADMIN — LOSARTAN POTASSIUM 100 MILLIGRAM(S): 100 TABLET, FILM COATED ORAL at 05:47

## 2024-03-31 RX ADMIN — TAMSULOSIN HYDROCHLORIDE 0.4 MILLIGRAM(S): 0.4 CAPSULE ORAL at 22:21

## 2024-03-31 RX ADMIN — Medication 81 MILLIGRAM(S): at 11:21

## 2024-03-31 RX ADMIN — Medication 25 MILLIGRAM(S): at 05:47

## 2024-03-31 RX ADMIN — AMLODIPINE BESYLATE 5 MILLIGRAM(S): 2.5 TABLET ORAL at 05:48

## 2024-03-31 NOTE — PROGRESS NOTE ADULT - ASSESSMENT
59 year old male with PMHx of HTN, HLD, BPH, CAD (s/p PCI in 2021), and Parkinson's disease who presented to the ED on 3/29/24 for complaints of recurrent falls and admitted for recurrent falls.    # Recurrent falls likely due Parkinsonism - per family, pt had been taken off Sinemet due to hallucinations.  Likely NEIL placement. Neuro input.  TTE  # HTN: PTA losartan 100 mg, amlodipine 5 mg, metoprolol succinate 25 mg, hold parameters added  # HLD: PTA atorvastatin 20 mg  # BPH: PTA tamsulosin 0.4 mg  # CAD (s/p PCI in 2021): PTA ASA 81 mg  # Normocytic anemia, chronic: hgb 12.0 on admission, baseline ~12.5?, no signs of bleeding    Plan of care discussed with fam Carrillo and leatha Butler at bedside.

## 2024-04-01 LAB
FLUAV AG NPH QL: SIGNIFICANT CHANGE UP
FLUBV AG NPH QL: SIGNIFICANT CHANGE UP
SARS-COV-2 RNA SPEC QL NAA+PROBE: SIGNIFICANT CHANGE UP

## 2024-04-01 PROCEDURE — 99232 SBSQ HOSP IP/OBS MODERATE 35: CPT

## 2024-04-01 RX ORDER — AMANTADINE HCL 100 MG
100 CAPSULE ORAL DAILY
Refills: 0 | Status: DISCONTINUED | OUTPATIENT
Start: 2024-04-01 | End: 2024-04-02

## 2024-04-01 RX ADMIN — TAMSULOSIN HYDROCHLORIDE 0.4 MILLIGRAM(S): 0.4 CAPSULE ORAL at 21:46

## 2024-04-01 RX ADMIN — Medication 25 MILLIGRAM(S): at 05:27

## 2024-04-01 RX ADMIN — AMLODIPINE BESYLATE 5 MILLIGRAM(S): 2.5 TABLET ORAL at 05:27

## 2024-04-01 RX ADMIN — LOSARTAN POTASSIUM 100 MILLIGRAM(S): 100 TABLET, FILM COATED ORAL at 05:28

## 2024-04-01 RX ADMIN — Medication 100 MILLIGRAM(S): at 21:45

## 2024-04-01 RX ADMIN — Medication 81 MILLIGRAM(S): at 13:36

## 2024-04-01 RX ADMIN — ATORVASTATIN CALCIUM 20 MILLIGRAM(S): 80 TABLET, FILM COATED ORAL at 21:46

## 2024-04-01 RX ADMIN — PREGABALIN 2000 MICROGRAM(S): 225 CAPSULE ORAL at 13:36

## 2024-04-01 NOTE — CONSULT NOTE ADULT - SUBJECTIVE AND OBJECTIVE BOX
Neurology consult    TRUNG [id number]     Patient is a 59y old  Male who presents with a chief complaint of Recurrent falls (31 Mar 2024 16:13)      HPI:  Trung Guajardo is a 59 year old male with PMHx of HTN, HLD, BPH, CAD (s/p PCI in 2021), and Parkinson's disease who presented to the ED on 3/29/24 for complaints of recurrent falls.    History primarily obtained from niece at bedside. Patient fell twice at home yesterday. Both occurrences were in the bathroom and patient was found on the ground. Patient sustained head hit during episode that occurred in the morning. Also fell once last week. All episodes were unwitnessed. Niece reports entire house is carpeted and patient wears slippers inside the house. Baseline ambulates unassisted and holds handrail to ambulate up and down the stairs. Can feed self and shower. Needs assistance getting dressed. Lives at home with sister, niece, and nephew who all assist him.    In the ED, VSS. Labs grossly unremarkable except hgb 12.0. Troponin WNL. NCHCT without acute intracranial findings. (30 Mar 2024 05:58)    tremors at times  REVIEW OF SYSTEMS:    Constitutional: No fever, chills, fatigue, weakness  Eyes: no eye pain, visual disturbances, or discharge  ENT:  No difficulty hearing, tinnitus, vertigo; No sinus or throat pain  Neck: No pain or stiffness  Respiratory: No cough, dyspnea, wheezing   Cardiovascular: No chest pain, palpitations,   Gastrointestinal: No abdominal or epigastric pain. No nausea, vomiting  No diarrhea or constipation.   Genitourinary: No dysuria, frequency, hematuria or incontinence  Neurological: No headaches, lightheadedness, vertigo, numbness  Psychiatric: No depression, anxiety, mood swings or difficulty sleeping  Musculoskeletal: No joint pain or swelling; No muscle, back or extremity pain  Skin: No itching, burning, rashes or lesions   Lymph Nodes: No enlarged glands  Endocrine: No heat or cold intolerance; No hair loss, No h/o diabetes or thyroid dysfunction  Allergy and Immunologic: No hives or eczema    MEDICATIONS    acetaminophen     Tablet .. 650 milliGRAM(s) Oral every 6 hours PRN  aluminum hydroxide/magnesium hydroxide/simethicone Suspension 30 milliLiter(s) Oral every 4 hours PRN  amLODIPine   Tablet 5 milliGRAM(s) Oral daily  aspirin  chewable 81 milliGRAM(s) Oral daily  atorvastatin 20 milliGRAM(s) Oral at bedtime  cyanocobalamin 2000 MICROGram(s) Oral daily  losartan 100 milliGRAM(s) Oral daily  melatonin 3 milliGRAM(s) Oral at bedtime PRN  metoprolol succinate ER 25 milliGRAM(s) Oral daily  ondansetron Injectable 4 milliGRAM(s) IV Push every 8 hours PRN  tamsulosin 0.4 milliGRAM(s) Oral at bedtime      PMH: HTN (hypertension)    HLD (hyperlipidemia)    CAD S/P percutaneous coronary angioplasty    BPH (benign prostatic hyperplasia)    Parkinson's disease         PSH:     Family history: No history of dementia, strokes, or seizures   FAMILY HISTORY:      SOCIAL HISTORY:  No history of tobacco or alcohol use     Allergies    No Known Allergies    Intolerances            Vital Signs Last 24 Hrs  T(C): 36.5 (01 Apr 2024 05:04), Max: 36.8 (31 Mar 2024 17:15)  T(F): 97.7 (01 Apr 2024 05:04), Max: 98.2 (31 Mar 2024 17:15)  HR: 59 (01 Apr 2024 05:04) (54 - 73)  BP: 125/68 (01 Apr 2024 05:04) (108/74 - 125/68)  BP(mean): --  RR: 18 (01 Apr 2024 05:04) (18 - 19)  SpO2: 98% (01 Apr 2024 05:04) (96% - 98%)    Parameters below as of 01 Apr 2024 05:04  Patient On (Oxygen Delivery Method): room air          On Neurological Examination:    Mental Status - Patient is alert, awake, oriented X3. fluent, names, ild dysarthria no aphasia Follows commands well psychomotor slowing    Cranial Nerves - PERRL, EOMI, VFF, V1-V3 intact, no gross facial asymmetry, tongue/uvula midline masked facies    Motor Exam -   no drift, bradykinessia, L > R rigidity    Sensory    Intact to light touch and pinprick bilaterally    Coord: FTN intact bilaterally     Gait -  deferred      GENERAL Exam:     Nontoxic , No Acute Distress   	  HEENT:  normocephalic, atraumatic  		  LUNGS:	Clear bilaterally  No Wheeze    	  HEART:	 Normal S1S2   No murmur RRR        	  GI/ ABDOMEN:  Soft  Non tender    EXTREMITIES:   No Edema  No Clubbing  No Cyanosis No Edema    MUSCULOSKELETAL: Normal Range of Motion  	   SKIN:      Normal   No Ecchymosis               LABS:              Hemoglobin A1C:             RADIOLOGY  < from: CT Head No Cont (03.30.24 @ 02:32) >    IMPRESSION:    No evidence of acute intracranial hemorrhage, midline shift or CT   evidence of acute territorial infarct.    If the patient's symptoms persist, consider short interval follow-up head   CT or brain MRI if there are no MRI contraindications.    --- End of Report ---    < end of copied text >

## 2024-04-01 NOTE — PROGRESS NOTE ADULT - ASSESSMENT
59 year old male with PMHx of HTN, HLD, BPH, CAD (s/p PCI in 2021), and Parkinson's disease who presented to the ED on 3/29/24 for complaints of recurrent falls and admitted for recurrent falls.    # Recurrent falls likely due Parkinsonism - per family, pt had been taken off Sinemet due to hallucinations.  Stable for NEIL placement. Neuro input appreciated.  Start Amantadine.  TTE showing normal LV function.    # HTN: PTA losartan 100 mg, amlodipine 5 mg, metoprolol succinate 25 mg, hold parameters added  # HLD: PTA atorvastatin 20 mg  # BPH: PTA tamsulosin 0.4 mg  # CAD (s/p PCI in 2021): PTA ASA 81 mg  # Normocytic anemia, chronic: hgb 12.0 on admission, baseline ~12.5?, no signs of bleeding    Plan of care discussed with leatha Butler at bedside.   59 year old male with PMHx of HTN, HLD, BPH, CAD (s/p PCI in 2021), and Parkinson's disease who presented to the ED on 3/29/24 for complaints of recurrent falls and admitted for recurrent falls.    # Recurrent falls likely due Parkinsonism - per family, pt had been taken off Sinemet due to hallucinations.  Stable for NEIL placement. Neuro input appreciated.  Start Amantadine.  TTE showing normal LV function.    # HTN: PTA losartan 100 mg, amlodipine 5 mg, metoprolol succinate 25 mg, hold parameters added  # HLD: PTA atorvastatin 20 mg  # BPH: PTA tamsulosin 0.4 mg  # CAD (s/p PCI in 2021): PTA ASA 81 mg  # Normocytic anemia, chronic: hgb 12.0 on admission, baseline ~12.5?, no signs of bleeding    Plan of care discussed with leatha Butler at bedside.  Stable for NEIL.  Not safe for d/c home due to gait instability.

## 2024-04-01 NOTE — CONSULT NOTE ADULT - ASSESSMENT
59 year old male with PMHx of HTN, HLD, BPH, CAD (s/p PCI in 2021), and Parkinson's disease who presented to the ED on 3/29/24 for complaints of recurrent falls.  DX with PD, Was on Sinemet, was discontinued due to hallucinations  O/E masked facies bradykinesia L > R rigidity      rigidity predominant PD    can consider starting amantadine 100 mg daily and titrate up further as an outpt

## 2024-04-02 ENCOUNTER — TRANSCRIPTION ENCOUNTER (OUTPATIENT)
Age: 59
End: 2024-04-02

## 2024-04-02 VITALS
SYSTOLIC BLOOD PRESSURE: 107 MMHG | TEMPERATURE: 98 F | DIASTOLIC BLOOD PRESSURE: 72 MMHG | OXYGEN SATURATION: 100 % | HEART RATE: 62 BPM | RESPIRATION RATE: 17 BRPM

## 2024-04-02 LAB
ANION GAP SERPL CALC-SCNC: 6 MMOL/L — SIGNIFICANT CHANGE UP (ref 5–17)
BUN SERPL-MCNC: 14 MG/DL — SIGNIFICANT CHANGE UP (ref 7–23)
CALCIUM SERPL-MCNC: 8.6 MG/DL — SIGNIFICANT CHANGE UP (ref 8.5–10.1)
CHLORIDE SERPL-SCNC: 110 MMOL/L — HIGH (ref 96–108)
CO2 SERPL-SCNC: 26 MMOL/L — SIGNIFICANT CHANGE UP (ref 22–31)
CREAT SERPL-MCNC: 0.76 MG/DL — SIGNIFICANT CHANGE UP (ref 0.5–1.3)
EGFR: 102 ML/MIN/1.73M2 — SIGNIFICANT CHANGE UP
GLUCOSE SERPL-MCNC: 94 MG/DL — SIGNIFICANT CHANGE UP (ref 70–99)
HCT VFR BLD CALC: 37.5 % — LOW (ref 39–50)
HGB BLD-MCNC: 12.1 G/DL — LOW (ref 13–17)
MCHC RBC-ENTMCNC: 29.4 PG — SIGNIFICANT CHANGE UP (ref 27–34)
MCHC RBC-ENTMCNC: 32.3 G/DL — SIGNIFICANT CHANGE UP (ref 32–36)
MCV RBC AUTO: 91.2 FL — SIGNIFICANT CHANGE UP (ref 80–100)
NRBC # BLD: 0 /100 WBCS — SIGNIFICANT CHANGE UP (ref 0–0)
PLATELET # BLD AUTO: 202 K/UL — SIGNIFICANT CHANGE UP (ref 150–400)
POTASSIUM SERPL-MCNC: 4.2 MMOL/L — SIGNIFICANT CHANGE UP (ref 3.5–5.3)
POTASSIUM SERPL-SCNC: 4.2 MMOL/L — SIGNIFICANT CHANGE UP (ref 3.5–5.3)
RBC # BLD: 4.11 M/UL — LOW (ref 4.2–5.8)
RBC # FLD: 13.4 % — SIGNIFICANT CHANGE UP (ref 10.3–14.5)
SODIUM SERPL-SCNC: 142 MMOL/L — SIGNIFICANT CHANGE UP (ref 135–145)
WBC # BLD: 5.16 K/UL — SIGNIFICANT CHANGE UP (ref 3.8–10.5)
WBC # FLD AUTO: 5.16 K/UL — SIGNIFICANT CHANGE UP (ref 3.8–10.5)

## 2024-04-02 PROCEDURE — 99239 HOSP IP/OBS DSCHRG MGMT >30: CPT

## 2024-04-02 RX ORDER — AMANTADINE HCL 100 MG
1 CAPSULE ORAL
Qty: 0 | Refills: 0 | DISCHARGE
Start: 2024-04-02

## 2024-04-02 RX ADMIN — PREGABALIN 2000 MICROGRAM(S): 225 CAPSULE ORAL at 12:11

## 2024-04-02 RX ADMIN — Medication 81 MILLIGRAM(S): at 12:10

## 2024-04-02 RX ADMIN — Medication 25 MILLIGRAM(S): at 05:22

## 2024-04-02 RX ADMIN — Medication 100 MILLIGRAM(S): at 12:11

## 2024-04-02 RX ADMIN — AMLODIPINE BESYLATE 5 MILLIGRAM(S): 2.5 TABLET ORAL at 05:21

## 2024-04-02 RX ADMIN — LOSARTAN POTASSIUM 100 MILLIGRAM(S): 100 TABLET, FILM COATED ORAL at 05:22

## 2024-04-02 NOTE — PROGRESS NOTE ADULT - ASSESSMENT
59 year old male with PMHx of HTN, HLD, BPH, CAD (s/p PCI in 2021), and Parkinson's disease who presented to the ED on 3/29/24 for complaints of recurrent falls and admitted for recurrent falls.    # Recurrent falls likely due Parkinsonism - per family, pt had been taken off Sinemet due to hallucinations.  Stable for NEIL placement. Neuro input appreciated.  Started Amantadine, discussed with Neuro.  TTE showing normal LV function.    # HTN: PTA losartan 100 mg, amlodipine 5 mg, metoprolol succinate 25 mg, hold parameters added  # HLD: PTA atorvastatin 20 mg  # BPH: PTA tamsulosin 0.4 mg  # CAD (s/p PCI in 2021): PTA ASA 81 mg  # Normocytic anemia, chronic: hgb 12.0 on admission, baseline ~12.5?, no signs of bleeding    Plan of care discussed with leatha Butler at bedside on 4/1.  Stable for NEIL.

## 2024-04-02 NOTE — DISCHARGE NOTE PROVIDER - NSDCFUADDINST_GEN_ALL_CORE_FT
It is important to see your primary physician as well as any specialty physicians within the next week to perform a comprehensive medical review.  Call their offices for an appointment as soon as you leave the hospital.  You will also need to see them for renewal of your medications.  If have any difficulty following with a physician, contact the [facility] [phone] or via [email or web address].   To obtain your results, you can access the [facility] Patient Portal at [email or web address].  Your medical issues appear to be stable at this time, but if your symptoms recur or worsen, contact your physicians and/or return to the hospital if necessary.  If you encounter any issues or questions with your medication, call your physicians before stopping the medication.  Do not drive.  Limit your diet to 2 grams of sodium daily.

## 2024-04-02 NOTE — DISCHARGE NOTE PROVIDER - NSDCMRMEDTOKEN_GEN_ALL_CORE_FT
amantadine 100 mg oral capsule: 1 cap(s) orally once a day  amLODIPine 5 mg oral tablet: 1 tab(s) orally once a day  aspirin 81 mg oral tablet: 1 tab(s) orally once a day  atorvastatin 20 mg oral tablet: 1 tab(s) orally once a day  cholecalciferol 1250 mcg (50,000 intl units) oral capsule: 1 cap(s) orally once a week  cyanocobalamin 1000 mcg oral tablet: 2 tab(s) orally once a day  losartan 100 mg oral tablet: 1 tab(s) orally once a day  lutein 20 mg oral capsule: 1 cap(s) orally once a day  metoprolol succinate 25 mg oral tablet, extended release: 1 tab(s) orally once a day  tamsulosin 0.4 mg oral capsule: 1 cap(s) orally once a day

## 2024-04-02 NOTE — PROGRESS NOTE ADULT - ASSESSMENT
59 year old male with PMHx of HTN, HLD, BPH, CAD (s/p PCI in 2021), and Parkinson's disease who presented to the ED on 3/29/24 for complaints of recurrent falls.  DX with PD, Was on Sinemet, was discontinued due to hallucinations  O/E masked facies bradykinesia L > R rigidity      rigidity predominant PD    continue with amantadine 100 mg daily and titrate up further as an outpt

## 2024-04-02 NOTE — DISCHARGE NOTE NURSING/CASE MANAGEMENT/SOCIAL WORK - PATIENT PORTAL LINK FT
You can access the FollowMyHealth Patient Portal offered by [facility] by registering at the following website: [email or web address]. By joining [facility]’s FollowMyHealth portal, you will also be able to view your health information using other applications (apps) compatible with our system.

## 2024-04-02 NOTE — DISCHARGE NOTE PROVIDER - NSDCCPCAREPLAN_GEN_ALL_CORE_FT
PRINCIPAL DISCHARGE DIAGNOSIS  Diagnosis: Near syncope  Assessment and Plan of Treatment:       SECONDARY DISCHARGE DIAGNOSES  Diagnosis: HTN (hypertension)  Assessment and Plan of Treatment:     Diagnosis: HLD (hyperlipidemia)  Assessment and Plan of Treatment:     Diagnosis: Recurrent falls  Assessment and Plan of Treatment:     Diagnosis: Parkinson disease  Assessment and Plan of Treatment:

## 2024-04-02 NOTE — DISCHARGE NOTE NURSING/CASE MANAGEMENT/SOCIAL WORK - NSDCPEFALRISK_GEN_ALL_CORE
For information on Fall & Injury Prevention, visit: [email or web address] OR  [email or web address] OR  https://www.cdc.gov/steadi/patient.html

## 2024-04-02 NOTE — PROGRESS NOTE ADULT - SUBJECTIVE AND OBJECTIVE BOX
Patient: GERONIMO GUAJARDO [id number] 59y Male                            Hospitalist Attending Note    No complaints.  Niece Lorena and nephew Luke at bedside.      ____________________PHYSICAL EXAM:  GENERAL:  NAD alert, interactive.    HEENT: NCAT  CARDIOVASCULAR:  S1, S2  LUNGS: CTAB  ABDOMEN:  soft, (-) tenderness, (-) distension, (+) bowel sounds, (-) guarding, (-) rebound (-) rigidity  EXTREMITIES:  no cyanosis / clubbing / edema.   NEURO: cogwheel rigidity.  Strength symmetric.   ____________________     VITALS:  Vital Signs Last 24 Hrs  T(C): 36.6 (31 Mar 2024 11:10), Max: 36.8 (31 Mar 2024 05:11)  T(F): 97.9 (31 Mar 2024 11:10), Max: 98.2 (31 Mar 2024 05:11)  HR: 73 (31 Mar 2024 11:10) (63 - 86)  BP: 108/74 (31 Mar 2024 11:10) (108/74 - 144/87)  BP(mean): --  RR: 19 (31 Mar 2024 11:10) (18 - 20)  SpO2: 96% (31 Mar 2024 11:10) (96% - 99%)    Parameters below as of 30 Mar 2024 17:22  Patient On (Oxygen Delivery Method): room air     Daily     Daily   CAPILLARY BLOOD GLUCOSE        I&O's Summary    30 Mar 2024 07:01  -  31 Mar 2024 07:00  --------------------------------------------------------  IN: 0 mL / OUT: 600 mL / NET: -600 mL    31 Mar 2024 07:01  -  31 Mar 2024 16:13  --------------------------------------------------------  IN: 450 mL / OUT: 600 mL / NET: -150 mL        HISTORY:  PAST MEDICAL & SURGICAL HISTORY:  HTN (hypertension)      HLD (hyperlipidemia)      CAD S/P percutaneous coronary angioplasty      BPH (benign prostatic hyperplasia)      Parkinson's disease      Allergies    No Known Allergies    Intolerances       LABS:                        12.0   6.46  )-----------( 189      ( 30 Mar 2024 02:30 )             37.0     03-30    139  |  108  |  19  ----------------------------<  93  4.6   |  26  |  1.03    Ca    9.4      30 Mar 2024 02:30    TPro  7.2  /  Alb  3.2<L>  /  TBili  0.6  /  DBili  x   /  AST  30  /  ALT  14  /  AlkPhos  70  03-30    PT/INR - ( 30 Mar 2024 02:30 )   PT: 13.6 sec;   INR: 1.14 ratio         PTT - ( 30 Mar 2024 02:30 )  PTT:32.1 sec  LIVER FUNCTIONS - ( 30 Mar 2024 02:30 )  Alb: 3.2 g/dL / Pro: 7.2 gm/dL / ALK PHOS: 70 U/L / ALT: 14 U/L / AST: 30 U/L / GGT: x           Urinalysis Basic - ( 30 Mar 2024 02:30 )    Color: x / Appearance: x / SG: x / pH: x  Gluc: 93 mg/dL / Ketone: x  / Bili: x / Urobili: x   Blood: x / Protein: x / Nitrite: x   Leuk Esterase: x / RBC: x / WBC x   Sq Epi: x / Non Sq Epi: x / Bacteria: x              MEDICATIONS:  MEDICATIONS  (STANDING):  amLODIPine   Tablet 5 milliGRAM(s) Oral daily  aspirin  chewable 81 milliGRAM(s) Oral daily  atorvastatin 20 milliGRAM(s) Oral at bedtime  cyanocobalamin 2000 MICROGram(s) Oral daily  losartan 100 milliGRAM(s) Oral daily  metoprolol succinate ER 25 milliGRAM(s) Oral daily  tamsulosin 0.4 milliGRAM(s) Oral at bedtime    MEDICATIONS  (PRN):  acetaminophen     Tablet .. 650 milliGRAM(s) Oral every 6 hours PRN Temp greater or equal to 38C (100.4F), Mild Pain (1 - 3)  aluminum hydroxide/magnesium hydroxide/simethicone Suspension 30 milliLiter(s) Oral every 4 hours PRN Dyspepsia  melatonin 3 milliGRAM(s) Oral at bedtime PRN Insomnia  ondansetron Injectable 4 milliGRAM(s) IV Push every 8 hours PRN Nausea and/or Vomiting  
                          Patient: GERONIMO GUAJARDO [id number] 59y Male                            Hospitalist Attending Note    No complaints.  nephignacio Butler at bedside.      ____________________PHYSICAL EXAM:  GENERAL:  NAD alert, interactive.    HEENT: NCAT  CARDIOVASCULAR:  S1, S2  LUNGS: CTAB  ABDOMEN:  soft, (-) tenderness, (-) distension, (+) bowel sounds, (-) guarding, (-) rebound (-) rigidity  EXTREMITIES:  no cyanosis / clubbing / edema.   NEURO: cogwheel rigidity.  Strength symmetric.   ____________________    VITALS:  Vital Signs Last 24 Hrs  T(C): 36.6 (01 Apr 2024 10:52), Max: 36.8 (31 Mar 2024 17:15)  T(F): 97.8 (01 Apr 2024 10:52), Max: 98.2 (31 Mar 2024 17:15)  HR: 62 (01 Apr 2024 10:52) (54 - 69)  BP: 120/79 (01 Apr 2024 10:52) (110/73 - 125/68)  BP(mean): --  RR: 19 (01 Apr 2024 10:52) (18 - 19)  SpO2: 95% (01 Apr 2024 10:52) (95% - 98%)    Parameters below as of 01 Apr 2024 10:52  Patient On (Oxygen Delivery Method): room air     Daily     Daily   CAPILLARY BLOOD GLUCOSE        I&O's Summary    31 Mar 2024 07:01  -  01 Apr 2024 07:00  --------------------------------------------------------  IN: 450 mL / OUT: 2200 mL / NET: -1750 mL        LABS:                        MEDICATIONS:  acetaminophen     Tablet .. 650 milliGRAM(s) Oral every 6 hours PRN  aluminum hydroxide/magnesium hydroxide/simethicone Suspension 30 milliLiter(s) Oral every 4 hours PRN  amantadine 100 milliGRAM(s) Oral daily  amLODIPine   Tablet 5 milliGRAM(s) Oral daily  aspirin  chewable 81 milliGRAM(s) Oral daily  atorvastatin 20 milliGRAM(s) Oral at bedtime  cyanocobalamin 2000 MICROGram(s) Oral daily  losartan 100 milliGRAM(s) Oral daily  melatonin 3 milliGRAM(s) Oral at bedtime PRN  metoprolol succinate ER 25 milliGRAM(s) Oral daily  ondansetron Injectable 4 milliGRAM(s) IV Push every 8 hours PRN  tamsulosin 0.4 milliGRAM(s) Oral at bedtime    
                          Patient: GERONIMO GUAJARDO [id number] 59y Male                            Hospitalist Attending Note    No complaints.    ____________________PHYSICAL EXAM:  GENERAL:  NAD alert, interactive.    HEENT: NCAT  CARDIOVASCULAR:  S1, S2  LUNGS: CTAB  ABDOMEN:  soft, (-) tenderness, (-) distension, (+) bowel sounds, (-) guarding, (-) rebound (-) rigidity  EXTREMITIES:  no cyanosis / clubbing / edema.   NEURO: cogwheel rigidity.  Strength symmetric.   ____________________      VITALS:  Vital Signs Last 24 Hrs  T(C): 36.4 (02 Apr 2024 11:14), Max: 37.1 (01 Apr 2024 15:31)  T(F): 97.5 (02 Apr 2024 11:14), Max: 98.7 (01 Apr 2024 15:31)  HR: 61 (02 Apr 2024 11:14) (57 - 72)  BP: 79/58 (02 Apr 2024 11:14) (79/58 - 129/89)  BP(mean): --  RR: 18 (02 Apr 2024 11:14) (18 - 18)  SpO2: 96% (02 Apr 2024 11:14) (95% - 97%)    Parameters below as of 02 Apr 2024 11:14  Patient On (Oxygen Delivery Method): room air     Daily     Daily   CAPILLARY BLOOD GLUCOSE        I&O's Summary    01 Apr 2024 07:01  -  02 Apr 2024 07:00  --------------------------------------------------------  IN: 480 mL / OUT: 1500 mL / NET: -1020 mL        LABS:                        12.1   5.16  )-----------( 202 ( 02 Apr 2024 05:40 )             37.5     04-02    142  |  110<H>  |  14  ----------------------------<  94  4.2   |  26  |  0.76    Ca    8.6      02 Apr 2024 05:40          Urinalysis Basic - ( 02 Apr 2024 05:40 )    Color: x / Appearance: x / SG: x / pH: x  Gluc: 94 mg/dL / Ketone: x  / Bili: x / Urobili: x   Blood: x / Protein: x / Nitrite: x   Leuk Esterase: x / RBC: x / WBC x   Sq Epi: x / Non Sq Epi: x / Bacteria: x              MEDICATIONS:  acetaminophen     Tablet .. 650 milliGRAM(s) Oral every 6 hours PRN  aluminum hydroxide/magnesium hydroxide/simethicone Suspension 30 milliLiter(s) Oral every 4 hours PRN  amantadine 100 milliGRAM(s) Oral daily  amLODIPine   Tablet 5 milliGRAM(s) Oral daily  aspirin  chewable 81 milliGRAM(s) Oral daily  atorvastatin 20 milliGRAM(s) Oral at bedtime  cyanocobalamin 2000 MICROGram(s) Oral daily  losartan 100 milliGRAM(s) Oral daily  melatonin 3 milliGRAM(s) Oral at bedtime PRN  metoprolol succinate ER 25 milliGRAM(s) Oral daily  ondansetron Injectable 4 milliGRAM(s) IV Push every 8 hours PRN  tamsulosin 0.4 milliGRAM(s) Oral at bedtime    
Patient seen and examined this am. No new events    MEDICATIONS:    acetaminophen     Tablet .. 650 milliGRAM(s) Oral every 6 hours PRN  aluminum hydroxide/magnesium hydroxide/simethicone Suspension 30 milliLiter(s) Oral every 4 hours PRN  amantadine 100 milliGRAM(s) Oral daily  amLODIPine   Tablet 5 milliGRAM(s) Oral daily  aspirin  chewable 81 milliGRAM(s) Oral daily  atorvastatin 20 milliGRAM(s) Oral at bedtime  cyanocobalamin 2000 MICROGram(s) Oral daily  losartan 100 milliGRAM(s) Oral daily  melatonin 3 milliGRAM(s) Oral at bedtime PRN  metoprolol succinate ER 25 milliGRAM(s) Oral daily  ondansetron Injectable 4 milliGRAM(s) IV Push every 8 hours PRN  tamsulosin 0.4 milliGRAM(s) Oral at bedtime      LABS:                          12.1   5.16  )-----------( 202      ( 02 Apr 2024 05:40 )             37.5     04-02    142  |  110<H>  |  14  ----------------------------<  94  4.2   |  26  |  0.76    Ca    8.6      02 Apr 2024 05:40      CAPILLARY BLOOD GLUCOSE          Urinalysis Basic - ( 02 Apr 2024 05:40 )    Color: x / Appearance: x / SG: x / pH: x  Gluc: 94 mg/dL / Ketone: x  / Bili: x / Urobili: x   Blood: x / Protein: x / Nitrite: x   Leuk Esterase: x / RBC: x / WBC x   Sq Epi: x / Non Sq Epi: x / Bacteria: x      I&O's Summary    01 Apr 2024 07:01  -  02 Apr 2024 07:00  --------------------------------------------------------  IN: 480 mL / OUT: 1500 mL / NET: -1020 mL    02 Apr 2024 07:01  -  02 Apr 2024 16:28  --------------------------------------------------------  IN: 560 mL / OUT: 800 mL / NET: -240 mL      Vital Signs Last 24 Hrs  T(C): 36.4 (02 Apr 2024 11:14), Max: 36.6 (02 Apr 2024 00:20)  T(F): 97.5 (02 Apr 2024 11:14), Max: 97.9 (02 Apr 2024 00:20)  HR: 64 (02 Apr 2024 12:24) (57 - 71)  BP: 120/85 (02 Apr 2024 12:24) (79/58 - 120/85)  BP(mean): --  RR: 18 (02 Apr 2024 11:14) (18 - 18)  SpO2: 95% (02 Apr 2024 12:06) (95% - 96%)    Parameters below as of 02 Apr 2024 12:06  Patient On (Oxygen Delivery Method): room air        On Neurological Examination:    Mental Status - Patient is alert, awake, oriented X3. fluent, names, ild dysarthria no aphasia Follows commands well psychomotor slowing    Cranial Nerves - PERRL, EOMI, VFF, V1-V3 intact, no gross facial asymmetry, tongue/uvula midline masked facies    Motor Exam -   no drift, bradykinessia, L > R rigidity    Sensory    Intact to light touch and pinprick bilaterally    Coord: FTN intact bilaterally     Gait -  deferred      GENERAL Exam:     Nontoxic , No Acute Distress   	  HEENT:  normocephalic, atraumatic  		  LUNGS:	Clear bilaterally  No Wheeze    	  HEART:	 Normal S1S2   No murmur RRR        	  GI/ ABDOMEN:  Soft  Non tender    EXTREMITIES:   No Edema  No Clubbing  No Cyanosis No Edema    MUSCULOSKELETAL: Normal Range of Motion  	   SKIN:      Normal   No Ecchymosis               LABS:              Hemoglobin A1C:             RADIOLOGY  < from: CT Head No Cont (03.30.24 @ 02:32) >    IMPRESSION:    No evidence of acute intracranial hemorrhage, midline shift or CT   evidence of acute territorial infarct.    If the patient's symptoms persist, consider short interval follow-up head   CT or brain MRI if there are no MRI contraindications.    --- End of Report ---    < end of copied text >

## 2024-04-02 NOTE — DISCHARGE NOTE PROVIDER - HOSPITAL COURSE
59 year old male with PMHx of HTN, HLD, BPH, CAD (s/p PCI in 2021), and Parkinson's disease who presented to the ED on 3/29/24 for complaints of recurrent falls and admitted for recurrent falls.    # Recurrent falls likely due Parkinsonism - per family, pt had been taken off Sinemet due to hallucinations.  Stable for NEIL placement. Neuro input appreciated.  Started Amantadine, discussed with Neuro.  TTE showing normal LV function.    # HTN: PTA losartan 100 mg, amlodipine 5 mg, metoprolol succinate 25 mg, hold parameters added  # HLD: PTA atorvastatin 20 mg  # BPH: PTA tamsulosin 0.4 mg  # CAD (s/p PCI in 2021): PTA ASA 81 mg  # Normocytic anemia, chronic: hgb 12.0 on admission, baseline ~12.5?, no signs of bleeding    Plan of care discussed with leatha Butler at bedside on 4/1.  Stable for NEIL.    Disposition: Stable for discharge.  Outpatient followup discussed.  Total time spent on discharge is  35  minutes.

## 2024-04-02 NOTE — DISCHARGE NOTE PROVIDER - CARE PROVIDER_API CALL
Jose Yin)  Neurology  [address], [address]  [place] [address]  Phone: [phone]  Fax: [phone]  Follow Up Time:     Primary MD,   Phone: (   )    -  Fax: (   )    -  Follow Up Time:

## 2024-04-09 DIAGNOSIS — E78.5 HYPERLIPIDEMIA, UNSPECIFIED: ICD-10-CM

## 2024-04-09 DIAGNOSIS — I25.10 ATHEROSCLEROTIC HEART DISEASE OF NATIVE CORONARY ARTERY WITHOUT ANGINA PECTORIS: ICD-10-CM

## 2024-04-09 DIAGNOSIS — R44.3 HALLUCINATIONS, UNSPECIFIED: ICD-10-CM

## 2024-04-09 DIAGNOSIS — R29.6 REPEATED FALLS: ICD-10-CM

## 2024-04-09 DIAGNOSIS — I10 ESSENTIAL (PRIMARY) HYPERTENSION: ICD-10-CM

## 2024-04-09 DIAGNOSIS — D64.9 ANEMIA, UNSPECIFIED: ICD-10-CM

## 2024-04-09 DIAGNOSIS — N40.0 BENIGN PROSTATIC HYPERPLASIA WITHOUT LOWER URINARY TRACT SYMPTOMS: ICD-10-CM

## 2024-04-09 DIAGNOSIS — G20.A1 PARKINSON'S DISEASE WITHOUT DYSKINESIA, WITHOUT MENTION OF FLUCTUATIONS: ICD-10-CM

## 2024-04-09 DIAGNOSIS — Z98.61 CORONARY ANGIOPLASTY STATUS: ICD-10-CM

## 2024-04-26 NOTE — ED ADULT NURSE NOTE - NSHOSCREENINGQ1_ED_ALL_ED
Medium Adult)   Pulse 76   Ht 1.854 m (6' 1\")   Wt 92.1 kg (203 lb)   SpO2 98%   BMI 26.78 kg/m²   Physical Exam  Vitals and nursing note reviewed.   Constitutional:       Appearance: He is well-developed.   Eyes:      Pupils: Pupils are equal, round, and reactive to light.   Neck:      Thyroid: No thyromegaly.      Vascular: No JVD.   Cardiovascular:      Rate and Rhythm: Normal rate and regular rhythm.   Pulmonary:      Effort: Pulmonary effort is normal.      Breath sounds: Normal breath sounds.   Abdominal:      General: Bowel sounds are normal.      Palpations: Abdomen is soft.      Tenderness: There is no abdominal tenderness.   Musculoskeletal:      Cervical back: Normal range of motion and neck supple.   Skin:     Findings: No rash.   Neurological:      Mental Status: He is alert and oriented to person, place, and time.      Cranial Nerves: No cranial nerve deficit.   Psychiatric:         Behavior: Behavior normal.         Thought Content: Thought content normal.         ASSESSMENT/PLAN:    1. Major depressive disorder in full remission, unspecified whether recurrent (HCC)  Stress management.  Patient will continue with Lexapro    2. Anxiety  Stable on Lexapro    3. Hypercholesterolemia  Reviewed and discussed last cholesterol labs.  Continue with pravastatin    Encouraged appropriate low-cholesterol/low-fat diet    4. Prediabetes  Reviewed last hemoglobin A1c.  Will continue to monitor      No orders of the defined types were placed in this encounter.    Current Outpatient Medications   Medication Sig Dispense Refill    pravastatin (PRAVACHOL) 20 MG tablet Take 1 tablet by mouth daily 30 tablet 2    escitalopram (LEXAPRO) 20 MG tablet Take 1 tablet by mouth daily 30 tablet 2    furosemide (LASIX) 20 MG tablet TAKE 1 TABLET BY MOUTH EVERY DAY AS NEEDED FOR SWELLING (Patient not taking: Reported on 10/20/2023) 30 tablet 0    triamterene-hydroCHLOROthiazide (MAXZIDE-25) 37.5-25 MG per tablet Take 1 tablet 
No

## 2024-05-11 PROBLEM — R94.39 ABNORMAL RESULT OF OTHER CARDIOVASCULAR FUNCTION STUDY: Chronic | Status: ACTIVE | Noted: 2021-09-07

## 2024-05-11 PROBLEM — E78.5 HYPERLIPIDEMIA, UNSPECIFIED: Chronic | Status: ACTIVE | Noted: 2021-09-07

## 2024-05-11 PROBLEM — I10 ESSENTIAL (PRIMARY) HYPERTENSION: Chronic | Status: ACTIVE | Noted: 2021-09-07

## 2024-05-11 RX ORDER — AMLODIPINE BESYLATE 2.5 MG/1
1 TABLET ORAL
Refills: 0 | DISCHARGE

## 2024-05-11 RX ORDER — ASPIRIN/CALCIUM CARB/MAGNESIUM 324 MG
1 TABLET ORAL
Refills: 0 | DISCHARGE

## 2024-05-11 RX ORDER — ATORVASTATIN CALCIUM 80 MG/1
1 TABLET, FILM COATED ORAL
Refills: 0 | DISCHARGE

## 2024-05-11 RX ORDER — LOSARTAN POTASSIUM 100 MG/1
1 TABLET, FILM COATED ORAL
Refills: 0 | DISCHARGE

## 2024-05-11 RX ORDER — CHOLECALCIFEROL (VITAMIN D3) 125 MCG
1 CAPSULE ORAL
Refills: 0 | DISCHARGE

## 2024-05-11 RX ORDER — TAMSULOSIN HYDROCHLORIDE 0.4 MG/1
1 CAPSULE ORAL
Refills: 0 | DISCHARGE

## 2024-05-11 RX ORDER — LUTEIN 20 MG
1 CAPSULE ORAL
Refills: 0 | DISCHARGE

## 2024-05-11 RX ORDER — METOPROLOL TARTRATE 50 MG
1 TABLET ORAL
Refills: 0 | DISCHARGE

## 2024-05-11 RX ORDER — PREGABALIN 225 MG/1
2 CAPSULE ORAL
Refills: 0 | DISCHARGE

## 2024-08-31 PROBLEM — G20.A1 PARKINSON'S DISEASE WITHOUT DYSKINESIA, WITHOUT MENTION OF FLUCTUATIONS: Chronic | Status: ACTIVE | Noted: 2024-01-01

## 2024-08-31 PROBLEM — N40.0 BENIGN PROSTATIC HYPERPLASIA WITHOUT LOWER URINARY TRACT SYMPTOMS: Chronic | Status: ACTIVE | Noted: 2024-01-01

## 2024-08-31 PROBLEM — E78.5 HYPERLIPIDEMIA, UNSPECIFIED: Chronic | Status: ACTIVE | Noted: 2024-01-01

## 2024-08-31 PROBLEM — I10 ESSENTIAL (PRIMARY) HYPERTENSION: Chronic | Status: ACTIVE | Noted: 2024-01-01

## 2024-08-31 PROBLEM — I25.10 ATHEROSCLEROTIC HEART DISEASE OF NATIVE CORONARY ARTERY WITHOUT ANGINA PECTORIS: Chronic | Status: ACTIVE | Noted: 2024-01-01

## 2024-08-31 NOTE — ED ADULT NURSE NOTE - CHIEF COMPLAINT QUOTE
Pt complains of difficulty walking. Per niece pt can normally walk with walker or sometimes independently. Pt with a history of Parkinson's Disease. Pt discharged from ER but did not leave due to inability to ambulate.

## 2024-08-31 NOTE — ED PROVIDER NOTE - CLINICAL SUMMARY MEDICAL DECISION MAKING FREE TEXT BOX
59M pmhx HTN, HLD, BPH, CAD sp PCI, Parkinson's disease, who was seen in the ED earlier after being found on floor at 830AM . Patient had cbc/cmp and ct head/c spine and pelvis , and xray R shoulder performed without findings of acute fracture / injury. Pt was accompanied by family and discharged home, however upon arriving home, we was unable to ambulate and per fam Carrillo, he kneeled onto the floor at b/l knees. Pt has hx of requiring rehab in the past. Pt typically is ambulatory with walker. Pt denies any active symptoms. He denies any pain or discomfort. He states he feels well. Discussed w/ Lorena [phone]  - recent ED note reviewed, no acute trauma, no lab abnormalities to explain symptoms, will admit due to inability to ambulate, may require rehab, d/w pt , pts fam and hospitalist.

## 2024-08-31 NOTE — ED ADULT TRIAGE NOTE - CHIEF COMPLAINT QUOTE
Pt complains of difficulty walking Pt complains of difficulty walking. Per niece pt can normally walk with walker or sometimes independently. Pt with a history of Parkinson's Disease. Pt discharged from ER but did not leave due to inability to ambulate.

## 2024-08-31 NOTE — ED PROVIDER NOTE - OBJECTIVE STATEMENT
59M pmhx HTN, HLD, BPH, CAD sp PCI, Parkinson's disease, who was seen in the ED earlier after being found on floor at 830AM . Patient had cbc/cmp and ct head/c spine and pelvis , and xray R shoulder performed without findings of acute fracture / injury. Pt was accompanied by family and discharged home, however upon arriving home, we was unable to ambulate and per niece Lorena, he kneeled onto the floor at b/l knees. Pt has hx of requiring rehab in the past. Pt typically is ambulatory with walker. Pt denies any active symptoms. He denies any pain or discomfort. He states he feels well. 59M pmhx HTN, HLD, BPH, CAD sp PCI, Parkinson's disease, who was seen in the ED earlier after being found on floor at 830AM . Patient had cbc/cmp and ct head/c spine and pelvis , and xray R shoulder performed without findings of acute fracture / injury. Pt was accompanied by family and discharged home, however even before leaving hospital, pt was unable to ambulate and per fam Carrillo, he kneeled onto the floor at b/l knees. Pt has hx of requiring rehab in the past. Pt typically is ambulatory with walker. Pt denies any active symptoms. He denies any pain or discomfort. He states he feels well. Discussed w/ Lorena [phone]

## 2024-08-31 NOTE — ED PROVIDER NOTE - PHYSICAL EXAMINATION
Gen: alert and oriented   Head: NCAT  ENT: Airway patent, moist mucous membranes, nasal passageways clear   Cardiac: Normal rate, normal rhythm   Respiratory: Lungs CTA B/L  Gastrointestinal: Abdomen soft, nontender, nondistended, no rebound, no guarding  MSK: No gross abnormalities, FROM of all four extremities, b/l lower extremity edema  HEME: Extremities warm and well perfused   Skin: No rashes, no lesions  Neuro: No gross neurologic deficits, leans towards right slightly and slight drooling while speaking

## 2024-08-31 NOTE — ED ADULT NURSE NOTE - NSFALLRISKINTERV_ED_ALL_ED

## 2024-08-31 NOTE — ED PROVIDER NOTE - NSICDXPASTMEDICALHX_GEN_ALL_CORE_FT
PAST MEDICAL HISTORY:  Abnormal stress test     BPH (benign prostatic hyperplasia)     CAD S/P percutaneous coronary angioplasty     HLD (hyperlipidemia)     HLD (hyperlipidemia)     HTN (hypertension)     HTN (hypertension)     Parkinson's disease

## 2024-08-31 NOTE — ED ADULT NURSE NOTE - OBJECTIVE STATEMENT
Pt has been discharged from the ED and niece and nephew would like him to be triaged again because pt is unable to ambulate out of the ED with walker, which is not his baseline. Niece states that patient is always able to ambulate with his walker and sometime independently. Pt5 was discharged but was uanble to ambulate when it was time to leave the ED. Safety maintained. [facility] parkinson's

## 2024-09-01 NOTE — PROGRESS NOTE ADULT - SUBJECTIVE AND OBJECTIVE BOX
58 yo M with  pmhx of  HTN, HLD, BPH, CAD s/p PCI, Parkinson's disease w/ dementia admitted for weakness and unsteady gait due to Parkinsons and was admitted for rehab placement. He is lying in bed in NAD.     MEDICATIONS  (STANDING):  amantadine 100 milliGRAM(s) Oral daily  amLODIPine   Tablet 5 milliGRAM(s) Oral daily  aspirin enteric coated 81 milliGRAM(s) Oral daily  atorvastatin 20 milliGRAM(s) Oral at bedtime  cyanocobalamin 1000 MICROGram(s) Oral daily  enoxaparin Injectable 40 milliGRAM(s) SubCutaneous every 24 hours  losartan 100 milliGRAM(s) Oral daily  metoprolol succinate ER 25 milliGRAM(s) Oral daily  pantoprazole    Tablet 40 milliGRAM(s) Oral before breakfast  tamsulosin 0.4 milliGRAM(s) Oral at bedtime    MEDICATIONS  (PRN):      Allergies    No Known Allergies    Intolerances        Vital Signs Last 24 Hrs  T(C): 36.4 (01 Sep 2024 11:07), Max: 36.7 (01 Sep 2024 01:27)  T(F): 97.6 (01 Sep 2024 11:07), Max: 98.1 (01 Sep 2024 01:27)  HR: 58 (01 Sep 2024 11:07) (54 - 61)  BP: 143/90 (01 Sep 2024 11:07) (121/82 - 147/94)  BP(mean): 110 (01 Sep 2024 05:34) (95 - 110)  RR: 18 (01 Sep 2024 11:07) (16 - 20)  SpO2: 99% (01 Sep 2024 11:07) (96% - 100%)    Parameters below as of 01 Sep 2024 11:07  Patient On (Oxygen Delivery Method): room air        PHYSICAL EXAM:  GENERAL: NAD   HEAD:  Atraumatic, Normocephalic  EYES: EOMI, PERRLA   NECK: Supple, No JVD, Normal thyroid  NERVOUS SYSTEM:  Alert    CHEST/LUNG: Clear to auscultation bilaterally; No rales, rhonchi, wheezing, or rubs  HEART: Regular rate and rhythm; No murmurs, rubs, or gallops  ABDOMEN: Soft, Nontender, Nondistended; Bowel sounds present  EXTREMITIES:  No clubbing, cyanosis, or edema     LABS:                        11.5   5.17  )-----------( 171      ( 31 Aug 2024 19:34 )             35.5     08-31    139  |  109<H>  |  18  ----------------------------<  93  4.9   |  25  |  0.94    Ca    9.4      31 Aug 2024 19:34    TPro  7.0  /  Alb  3.2<L>  /  TBili  0.5  /  DBili  x   /  AST  85<H>  /  ALT  52  /  AlkPhos  86  08-31      Urinalysis Basic - ( 31 Aug 2024 19:34 )    Color: x / Appearance: x / SG: x / pH: x  Gluc: 93 mg/dL / Ketone: x  / Bili: x / Urobili: x   Blood: x / Protein: x / Nitrite: x   Leuk Esterase: x / RBC: x / WBC x   Sq Epi: x / Non Sq Epi: x / Bacteria: x      CAPILLARY BLOOD GLUCOSE          RADIOLOGY & ADDITIONAL TESTS:   58 yo M with  pmhx of  HTN, HLD, BPH, CAD s/p PCI, Parkinson's disease w/ dementia admitted for weakness and unsteady gait due to Parkinsons and was admitted for rehab placement. He is lying in bed in NAD.     MEDICATIONS  (STANDING):  amantadine 100 milliGRAM(s) Oral daily  amLODIPine   Tablet 5 milliGRAM(s) Oral daily  aspirin enteric coated 81 milliGRAM(s) Oral daily  atorvastatin 20 milliGRAM(s) Oral at bedtime  cyanocobalamin 1000 MICROGram(s) Oral daily  enoxaparin Injectable 40 milliGRAM(s) SubCutaneous every 24 hours  losartan 100 milliGRAM(s) Oral daily  metoprolol succinate ER 25 milliGRAM(s) Oral daily  pantoprazole    Tablet 40 milliGRAM(s) Oral before breakfast  tamsulosin 0.4 milliGRAM(s) Oral at bedtime    MEDICATIONS  (PRN):      Allergies    No Known Allergies    Intolerances        Vital Signs Last 24 Hrs  T(C): 36.4 (01 Sep 2024 11:07), Max: 36.7 (01 Sep 2024 01:27)  T(F): 97.6 (01 Sep 2024 11:07), Max: 98.1 (01 Sep 2024 01:27)  HR: 58 (01 Sep 2024 11:07) (54 - 61)  BP: 143/90 (01 Sep 2024 11:07) (121/82 - 147/94)  BP(mean): 110 (01 Sep 2024 05:34) (95 - 110)  RR: 18 (01 Sep 2024 11:07) (16 - 20)  SpO2: 99% (01 Sep 2024 11:07) (96% - 100%)    Parameters below as of 01 Sep 2024 11:07  Patient On (Oxygen Delivery Method): room air        PHYSICAL EXAM:  GENERAL: NAD   HEAD:  Atraumatic, Normocephalic  EYES: EOMI, PERRLA   NECK: Supple, No JVD, Normal thyroid  NERVOUS SYSTEM: lethargic   CHEST/LUNG: Clear to auscultation bilaterally; No rales, rhonchi, wheezing, or rubs  HEART: Regular rate and rhythm; No murmurs, rubs, or gallops  ABDOMEN: Soft, Nontender, Nondistended; Bowel sounds present  EXTREMITIES: mild bilateral LE edema     LABS:                        11.5   5.17  )-----------( 171      ( 31 Aug 2024 19:34 )             35.5     08-31    139  |  109<H>  |  18  ----------------------------<  93  4.9   |  25  |  0.94    Ca    9.4      31 Aug 2024 19:34    TPro  7.0  /  Alb  3.2<L>  /  TBili  0.5  /  DBili  x   /  AST  85<H>  /  ALT  52  /  AlkPhos  86  08-31      Urinalysis Basic - ( 31 Aug 2024 19:34 )    Color: x / Appearance: x / SG: x / pH: x  Gluc: 93 mg/dL / Ketone: x  / Bili: x / Urobili: x   Blood: x / Protein: x / Nitrite: x   Leuk Esterase: x / RBC: x / WBC x   Sq Epi: x / Non Sq Epi: x / Bacteria: x      CAPILLARY BLOOD GLUCOSE          RADIOLOGY & ADDITIONAL TESTS:

## 2024-09-01 NOTE — PROGRESS NOTE ADULT - ASSESSMENT
58 yo M with  pmhx of  HTN, HLD, BPH, CAD s/p PCI, Parkinson's disease w/ dementia admitted for weakness and unsteady gait due to Parkinsons and was admitted for rehab placement.     Weakness w/ Unsteady gait due to Parkinson's   - PT eval pending   - c/w Amantadine    HTN  - c/w Norvasc, metoprolol & Losartan    HLD  - c/w Lipitor    CAD  - c/w ASA    BPH  - c/w Flomax    Constipation  - CT showed rectum is distended with stool to approximately 8.8 cm in transverse dimension   - start senna and lactulose     Prophylaxis:  GI: Protonix  DVT: Lovenox

## 2024-09-01 NOTE — H&P ADULT - NSHPPHYSICALEXAM_GEN_ALL_CORE
PHYSICAL EXAM:  GENERAL: NAD, lying in bed comfortably  HEAD:  Atraumatic, Normocephalic, healing abrasion to right forehead   EYES: EOMI, PERRLA, conjunctiva and sclera clear  ENT: Moist mucous membranes  NECK: Supple, No JVD  CHEST/LUNG: Clear to auscultation bilaterally; No rales, rhonchi, wheezing, or rubs. Unlabored respirations  HEART: Regular rate and rhythm; No murmurs, rubs, or gallops  ABDOMEN: Bowel sounds present; Soft, Nontender, Nondistended. No hepatomegally  EXTREMITIES:  2+ Peripheral Pulses, brisk capillary refill. No clubbing, cyanosis, or edema  NERVOUS SYSTEM:  Alert & Oriented X1, speech not  clear.   MSK: FROM all 4 extremities, full and equal strength  SKIN: No rashes or lesions

## 2024-09-01 NOTE — H&P ADULT - HISTORY OF PRESENT ILLNESS
This is a 59M with  pmhx of  HTN, HLD, BPH, CAD s/p PCI, Parkinson's disease admitted for weakness and unsteady gait . Pt was seen and discharged  in the ED earlier after being found on floor.  As per ER HPI documentation , Upon arriving home, we was unable to ambulate and per niece Lorena, he kneeled onto the floor at b/l knees. Pt has hx of requiring rehab in the past. Pt typically is ambulatory with walker.   Pt appears confused and only A&O x1 , sleepy , although pt denies any  CP/pain/SOB/Dizziness/palp/N/V/D or discomfort. He states he feels well. With previous  admission pt  had cbc/cmp and ct head/c spine and pelvis , and xray R shoulder performed without findings of acute fracture / injury and recent ED note reviewed, no acute trauma, no lab abnormalities to explain symptoms, Pt is a social admit and for unsteady gait due to parkinsons may require rehab, Pt at present appears in NAD.    This is a 59M with  pmhx of  HTN, HLD, BPH, CAD s/p PCI, Parkinson's disease admitted for weakness and unsteady gait . Pt was seen and discharged  in the ED earlier after being found on floor.  As per ER HPI documentation , Upon arriving home, we was unable to ambulate and per niece Lorena, he kneeled onto the floor at b/l knees. Pt has hx of requiring rehab in the past. Pt typically is ambulatory with walker. Pt appears confused and only A&O x1 , sleepy , although pt denies any  CP/pain/SOB/Dizziness/palp/N/V/D or discomfort. He states he feels well. With previous  admission pt  had cbc/cmp and ct head/c spine and pelvis , and xray R shoulder performed without findings of acute fracture / injury and recent ED note reviewed, no acute trauma, no lab abnormalities to explain symptoms, Pt is a social admit and for unsteady gait due to parkinsons may require rehab, Pt at present appears in NAD.

## 2024-09-01 NOTE — H&P ADULT - NSHPADDITIONALINFOADULT_GEN_ALL_CORE
ASSESSMENT     Weakness  Unsteady gait  Parkinsons  Hx of HTN , HLD, CAD, BPH, S/p  PCI      PLAN    Continue current medication regimen   Bedrest  GI PPX  DVT PPX  Diet : as tolerated  Placement disposition by care management /social work ASSESSMENT     Weakness  Unsteady gait  Parkinsons  Hx of HTN , HLD, CAD, BPH, S/p  PCI      PLAN    Continue current medication regimen   Bedrest  GI PPX  DVT PPX  Diet : as tolerated  sarah and speech and swallow eval   Placement disposition by care management /social work

## 2024-09-01 NOTE — PATIENT PROFILE ADULT - FALL HARM RISK - HARM RISK INTERVENTIONS
Assistance with ambulation/Assistance OOB with selected safe patient handling equipment/Communicate Risk of Fall with Harm to all staff/Discuss with provider need for PT consult/Monitor gait and stability/Reinforce activity limits and safety measures with patient and family/Tailored Fall Risk Interventions/Visual Cue: Yellow wristband and red socks/Bed in lowest position, wheels locked, appropriate side rails in place/Call bell, personal items and telephone in reach/Instruct patient to call for assistance before getting out of bed or chair/Non-slip footwear when patient is out of bed/[place] to call system/Physically safe environment - no spills, clutter or unnecessary equipment/Purposeful Proactive Rounding/Room/bathroom lighting operational, light cord in reach

## 2024-09-02 NOTE — CHART NOTE - NSCHARTNOTEFT_GEN_A_CORE
Called and spoke to patient's sister [phone] who he is currently living with.  She contacted Gina Ferguson [phone] patient's wife (, lives in [place]) updated her to situation as well.  Endorsed we are doing everything we possibly can.  Patient's niece Lorena [phone] who also lives with him was updated as well. Called and spoke to patient's sister Gem Oneal [phone] who he is currently living with.  She contacted Gina Ferguson [phone] patient's wife (, lives in [place]) updated her to situation as well.   Patient's niece Lorena [phone] who also lives with him was updated as well.  They all endorse that his is full code.  Endorsed we are doing everything we possibly can.

## 2024-09-02 NOTE — DISCHARGE NOTE FOR THE EXPIRED PATIENT - NS PATIENT DEATH CRITERIA
Attempted to contact patient to prescreen without success, screening will be done at door. Patient is dead based upon Brain Death Criteria/Patient is dead based on Cardiopulmonary criteria including absent breath sounds, pulselessness and/or asystole

## 2024-09-02 NOTE — DISCHARGE NOTE FOR THE EXPIRED PATIENT - HOSPITAL COURSE
59M with  pmhx of  HTN, HLD, BPH, CAD s/p PCI, Parkinson's disease admitted for weakness and unsteady gait . Pt was seen and discharged  in the ED earlier after being found on floor.  As per ER HPI documentation , Upon arriving home, we was unable to ambulate and per niece Lorena, he kneeled onto the floor at b/l knees. Pt has hx of requiring rehab in the past. Pt typically is ambulatory with walker. Pt appears confused and only A&O x1 , sleepy , although pt denies any  CP/pain/SOB/Dizziness/palp/N/V/D or discomfort. He states he feels well. With previous  admission pt  had cbc/cmp and ct head/c spine and pelvis , and xray R shoulder performed without findings of acute fracture / injury and recent ED note reviewed, no acute trauma, no lab abnormalities to explain symptoms, P, Pt at present appears in NAD.    This evening pt was seen non responsive without pulse 5:10am code blue was called at 5:11am and high quality chest compression was initiated immediately . After several rounds of CPR monitor was still asystole and patient had no pulse. hypoglycemia was corrected with dextrose and pt still had no  pulse . CPR was stopped at 5:41am after at least 6 rounds of CPR with epineprine . Pt was pronounced dead at 5:42am .  Sister was called and condolences were expressed .

## 2024-09-11 DIAGNOSIS — I10 ESSENTIAL (PRIMARY) HYPERTENSION: ICD-10-CM

## 2024-09-11 DIAGNOSIS — Z79.82 LONG TERM (CURRENT) USE OF ASPIRIN: ICD-10-CM

## 2024-09-11 DIAGNOSIS — Z95.5 PRESENCE OF CORONARY ANGIOPLASTY IMPLANT AND GRAFT: ICD-10-CM

## 2024-09-11 DIAGNOSIS — N40.0 BENIGN PROSTATIC HYPERPLASIA WITHOUT LOWER URINARY TRACT SYMPTOMS: ICD-10-CM

## 2024-09-11 DIAGNOSIS — Z91.81 HISTORY OF FALLING: ICD-10-CM

## 2024-09-11 DIAGNOSIS — I46.9 CARDIAC ARREST, CAUSE UNSPECIFIED: ICD-10-CM

## 2024-09-11 DIAGNOSIS — E16.2 HYPOGLYCEMIA, UNSPECIFIED: ICD-10-CM

## 2024-09-11 DIAGNOSIS — Z75.1 PERSON AWAITING ADMISSION TO ADEQUATE FACILITY ELSEWHERE: ICD-10-CM

## 2024-09-11 DIAGNOSIS — G20.A1 PARKINSON'S DISEASE WITHOUT DYSKINESIA, WITHOUT MENTION OF FLUCTUATIONS: ICD-10-CM

## 2024-09-11 DIAGNOSIS — I25.10 ATHEROSCLEROTIC HEART DISEASE OF NATIVE CORONARY ARTERY WITHOUT ANGINA PECTORIS: ICD-10-CM

## 2024-09-11 DIAGNOSIS — R26.2 DIFFICULTY IN WALKING, NOT ELSEWHERE CLASSIFIED: ICD-10-CM

## 2024-09-11 DIAGNOSIS — F02.80 DEMENTIA IN OTHER DISEASES CLASSIFIED ELSEWHERE, UNSPECIFIED SEVERITY, WITHOUT BEHAVIORAL DISTURBANCE, PSYCHOTIC DISTURBANCE, MOOD DISTURBANCE, AND ANXIETY: ICD-10-CM

## 2024-09-11 DIAGNOSIS — K59.00 CONSTIPATION, UNSPECIFIED: ICD-10-CM
